# Patient Record
Sex: FEMALE | Race: WHITE | NOT HISPANIC OR LATINO
[De-identification: names, ages, dates, MRNs, and addresses within clinical notes are randomized per-mention and may not be internally consistent; named-entity substitution may affect disease eponyms.]

---

## 2017-01-27 ENCOUNTER — RECORD ABSTRACTING (OUTPATIENT)
Age: 27
End: 2017-01-27

## 2017-01-27 DIAGNOSIS — Z80.8 FAMILY HISTORY OF MALIGNANT NEOPLASM OF OTHER ORGANS OR SYSTEMS: ICD-10-CM

## 2017-01-27 DIAGNOSIS — Z86.19 PERSONAL HISTORY OF OTHER INFECTIOUS AND PARASITIC DISEASES: ICD-10-CM

## 2017-01-27 DIAGNOSIS — D23.5 OTHER BENIGN NEOPLASM OF SKIN OF TRUNK: ICD-10-CM

## 2017-01-27 DIAGNOSIS — Z78.9 OTHER SPECIFIED HEALTH STATUS: ICD-10-CM

## 2017-01-27 PROBLEM — Z00.00 ENCOUNTER FOR PREVENTIVE HEALTH EXAMINATION: Status: ACTIVE | Noted: 2017-01-27

## 2017-02-22 ENCOUNTER — APPOINTMENT (OUTPATIENT)
Dept: DERMATOLOGY | Facility: CLINIC | Age: 27
End: 2017-02-22

## 2017-06-08 ENCOUNTER — APPOINTMENT (OUTPATIENT)
Dept: MRI IMAGING | Facility: CLINIC | Age: 27
End: 2017-06-08

## 2017-06-08 ENCOUNTER — OUTPATIENT (OUTPATIENT)
Dept: OUTPATIENT SERVICES | Facility: HOSPITAL | Age: 27
LOS: 1 days | End: 2017-06-08
Payer: COMMERCIAL

## 2017-06-08 DIAGNOSIS — Z00.8 ENCOUNTER FOR OTHER GENERAL EXAMINATION: ICD-10-CM

## 2017-06-08 PROCEDURE — 72141 MRI NECK SPINE W/O DYE: CPT | Mod: 26

## 2017-06-08 PROCEDURE — 72146 MRI CHEST SPINE W/O DYE: CPT

## 2017-06-08 PROCEDURE — 72146 MRI CHEST SPINE W/O DYE: CPT | Mod: 26

## 2017-06-08 PROCEDURE — 72141 MRI NECK SPINE W/O DYE: CPT

## 2017-07-25 ENCOUNTER — APPOINTMENT (OUTPATIENT)
Dept: DERMATOLOGY | Facility: CLINIC | Age: 27
End: 2017-07-25

## 2017-07-25 VITALS — HEIGHT: 62 IN | WEIGHT: 110 LBS | BODY MASS INDEX: 20.24 KG/M2

## 2017-07-25 DIAGNOSIS — B07.9 VIRAL WART, UNSPECIFIED: ICD-10-CM

## 2017-07-25 DIAGNOSIS — F41.8 OTHER SPECIFIED ANXIETY DISORDERS: ICD-10-CM

## 2017-07-25 RX ORDER — SPIRONOLACTONE 25 MG/1
25 TABLET ORAL
Refills: 0 | Status: DISCONTINUED | COMMUNITY
End: 2017-07-25

## 2017-07-27 ENCOUNTER — OTHER (OUTPATIENT)
Age: 27
End: 2017-07-27

## 2018-05-15 ENCOUNTER — APPOINTMENT (OUTPATIENT)
Dept: DERMATOLOGY | Facility: CLINIC | Age: 28
End: 2018-05-15
Payer: COMMERCIAL

## 2018-05-15 PROCEDURE — 99214 OFFICE O/P EST MOD 30 MIN: CPT

## 2018-05-15 RX ORDER — CLINDAMYCIN PHOSPHATE AND BENZOYL PEROXIDE 10; 25 MG/G; MG/G
1.2-2.5 GEL TOPICAL
Qty: 1 | Refills: 2 | Status: DISCONTINUED | COMMUNITY
End: 2018-05-15

## 2018-06-20 ENCOUNTER — APPOINTMENT (OUTPATIENT)
Dept: DERMATOLOGY | Facility: CLINIC | Age: 28
End: 2018-06-20
Payer: COMMERCIAL

## 2018-06-20 PROCEDURE — 99214 OFFICE O/P EST MOD 30 MIN: CPT

## 2018-06-20 RX ORDER — SPIRONOLACTONE 50 MG/1
50 TABLET ORAL
Qty: 60 | Refills: 3 | Status: DISCONTINUED | COMMUNITY
Start: 2018-05-15 | End: 2018-06-20

## 2018-07-13 ENCOUNTER — APPOINTMENT (OUTPATIENT)
Dept: DERMATOLOGY | Facility: CLINIC | Age: 28
End: 2018-07-13
Payer: COMMERCIAL

## 2018-07-13 PROCEDURE — 99214 OFFICE O/P EST MOD 30 MIN: CPT

## 2018-07-13 RX ORDER — MINOCYCLINE HYDROCHLORIDE 100 MG/1
100 CAPSULE ORAL
Qty: 60 | Refills: 0 | Status: DISCONTINUED | COMMUNITY
Start: 2018-06-04 | End: 2018-07-13

## 2018-07-13 RX ORDER — SERTRALINE 25 MG/1
TABLET, FILM COATED ORAL
Refills: 0 | Status: DISCONTINUED | COMMUNITY
End: 2018-07-13

## 2018-07-13 RX ORDER — TAZAROTENE 0.05 MG/G
0.05 CREAM CUTANEOUS
Qty: 50 | Refills: 2 | Status: DISCONTINUED | COMMUNITY
Start: 2017-07-25 | End: 2018-07-13

## 2018-07-13 RX ORDER — TRETINOIN 0.8 MG/G
0.08 GEL TOPICAL
Refills: 0 | Status: DISCONTINUED | COMMUNITY
End: 2018-07-13

## 2018-07-20 ENCOUNTER — APPOINTMENT (OUTPATIENT)
Dept: DERMATOLOGY | Facility: CLINIC | Age: 28
End: 2018-07-20

## 2018-07-23 PROBLEM — Z80.8 FAMILY HISTORY OF SKIN CANCER: Status: ACTIVE | Noted: 2017-01-27

## 2018-08-17 ENCOUNTER — APPOINTMENT (OUTPATIENT)
Dept: DERMATOLOGY | Facility: CLINIC | Age: 28
End: 2018-08-17
Payer: COMMERCIAL

## 2018-08-17 PROCEDURE — 99213 OFFICE O/P EST LOW 20 MIN: CPT

## 2018-08-17 RX ORDER — DAPSONE 50 MG/G
5 GEL TOPICAL
Refills: 0 | Status: DISCONTINUED | COMMUNITY
End: 2018-08-17

## 2018-08-17 RX ORDER — CLONAZEPAM 0.5 MG/1
0.5 TABLET ORAL
Qty: 60 | Refills: 0 | Status: DISCONTINUED | COMMUNITY
Start: 2018-01-12 | End: 2018-08-17

## 2018-08-17 RX ORDER — IMIQUIMOD 50 MG/G
5 CREAM TOPICAL
Qty: 1 | Refills: 2 | Status: DISCONTINUED | COMMUNITY
Start: 2017-07-25 | End: 2018-08-17

## 2018-09-14 ENCOUNTER — APPOINTMENT (OUTPATIENT)
Dept: DERMATOLOGY | Facility: CLINIC | Age: 28
End: 2018-09-14

## 2018-09-24 ENCOUNTER — RX RENEWAL (OUTPATIENT)
Age: 28
End: 2018-09-24

## 2018-11-23 ENCOUNTER — EMERGENCY (EMERGENCY)
Facility: HOSPITAL | Age: 28
LOS: 0 days | Discharge: HOME | End: 2018-11-23
Attending: EMERGENCY MEDICINE | Admitting: EMERGENCY MEDICINE
Payer: COMMERCIAL

## 2018-11-23 VITALS
DIASTOLIC BLOOD PRESSURE: 89 MMHG | SYSTOLIC BLOOD PRESSURE: 119 MMHG | RESPIRATION RATE: 19 BRPM | OXYGEN SATURATION: 98 % | HEART RATE: 105 BPM | TEMPERATURE: 98 F

## 2018-11-23 VITALS — WEIGHT: 111.99 LBS | HEIGHT: 63 IN

## 2018-11-23 DIAGNOSIS — L98.8 OTHER SPECIFIED DISORDERS OF THE SKIN AND SUBCUTANEOUS TISSUE: ICD-10-CM

## 2018-11-23 PROCEDURE — 99283 EMERGENCY DEPT VISIT LOW MDM: CPT

## 2018-11-23 NOTE — ED STATDOCS - PROGRESS NOTE DETAILS
SNEHA Mullen:   Patient has been seen, evaluated and orders have been written by the attending in intake. Patient is stable.  I will follow up the results of orders written and I will continue to evaluate/observe the patient.  Dana Mullen PA-C Will dc home.  F?U with Derm on Tues.  Try antifungal cream to area BID.  Keep open to air.  Avoid touching.  Cont. Bactroban as directed.  Benadryl if itching.  Dana Mullen PA-C

## 2018-11-23 NOTE — ED STATDOCS - SKIN, MLM
+2cm area of erosion on the anterior chin, mildly erythematous, excoriation. No drainage, no vesicles, no discharge.

## 2018-11-23 NOTE — ED STATDOCS - OBJECTIVE STATEMENT
29 y/o female with no pertinent PMHx presents to the ED c/o worsening rash to chin x1 month. Pt went to dermatologist 1 week ago where she was determined to have an infection, was prescribed topical abx and cephalexin BID at that time which pt has been taking for 1 week. At time of eval, pt states the rash burns when she touches it and it is very itchy, pt states it wakes her up at night due to discomfort. Pt states she occasionally picks at the rash. Rash is localized to chin, denies rash elsewhere. Pt has appointment with dermatology on 11/27/18. No other acute complaints at time of eval. PCP: Dr. Scott; Derm: Dr. Rosen.

## 2018-11-23 NOTE — ED STATDOCS - MEDICAL DECISION MAKING DETAILS
Pt with skin erosion to chin.  Already being treated with antibiotics with mild improvement.  Will add antifungal.  Will have pt f/u with dermatology on Tuesday as already scheduled.

## 2018-11-27 ENCOUNTER — APPOINTMENT (OUTPATIENT)
Dept: DERMATOLOGY | Facility: CLINIC | Age: 28
End: 2018-11-27
Payer: COMMERCIAL

## 2018-11-27 DIAGNOSIS — L29.9 PRURITUS, UNSPECIFIED: ICD-10-CM

## 2018-11-27 DIAGNOSIS — L98.1 FACTITIAL DERMATITIS: ICD-10-CM

## 2018-11-27 PROCEDURE — 99214 OFFICE O/P EST MOD 30 MIN: CPT | Mod: 25

## 2018-11-27 PROCEDURE — 11900 INJECT SKIN LESIONS </W 7: CPT

## 2018-12-03 LAB — BACTERIA WND CULT: NORMAL

## 2018-12-05 ENCOUNTER — APPOINTMENT (OUTPATIENT)
Dept: DERMATOLOGY | Facility: CLINIC | Age: 28
End: 2018-12-05

## 2019-01-02 ENCOUNTER — RX RENEWAL (OUTPATIENT)
Age: 29
End: 2019-01-02

## 2019-01-05 ENCOUNTER — EMERGENCY (EMERGENCY)
Facility: HOSPITAL | Age: 29
LOS: 0 days | Discharge: HOME | End: 2019-01-06
Attending: EMERGENCY MEDICINE | Admitting: EMERGENCY MEDICINE
Payer: COMMERCIAL

## 2019-01-05 VITALS
WEIGHT: 110.01 LBS | SYSTOLIC BLOOD PRESSURE: 126 MMHG | RESPIRATION RATE: 18 BRPM | HEART RATE: 91 BPM | OXYGEN SATURATION: 99 % | DIASTOLIC BLOOD PRESSURE: 81 MMHG | TEMPERATURE: 99 F | HEIGHT: 62 IN

## 2019-01-05 DIAGNOSIS — G91.9 HYDROCEPHALUS, UNSPECIFIED: ICD-10-CM

## 2019-01-05 DIAGNOSIS — F32.9 MAJOR DEPRESSIVE DISORDER, SINGLE EPISODE, UNSPECIFIED: ICD-10-CM

## 2019-01-05 DIAGNOSIS — F68.8 OTHER SPECIFIED DISORDERS OF ADULT PERSONALITY AND BEHAVIOR: ICD-10-CM

## 2019-01-05 DIAGNOSIS — F41.9 ANXIETY DISORDER, UNSPECIFIED: ICD-10-CM

## 2019-01-05 DIAGNOSIS — F90.9 ATTENTION-DEFICIT HYPERACTIVITY DISORDER, UNSPECIFIED TYPE: ICD-10-CM

## 2019-01-05 DIAGNOSIS — F43.25 ADJUSTMENT DISORDER WITH MIXED DISTURBANCE OF EMOTIONS AND CONDUCT: ICD-10-CM

## 2019-01-05 PROCEDURE — 99285 EMERGENCY DEPT VISIT HI MDM: CPT | Mod: 25

## 2019-01-05 NOTE — ED PROVIDER NOTE - OBJECTIVE STATEMENT
Pt. is a 27 yo F with a hx of ADHD, depression, anxiety, borderline personality disorder living with parents and going to college BIB ambulance for "nervous breakdown" per dad.  Patient states she is here because she "can't think, can't speak, and wants to have sex with herself."  Pt. states she had recent ED visit due to crawling sensation on skin and chin.  Dad states 4 years ago patient had similar "meltdown."  States since college has been on break her routine is broken and her younger sisters have been home and socially her environment changed.  Pt. takes sertraline per dad and he thinks she smokes pot but does not suspect other drug use.  Pt. does not answer questions directly when interviewed.

## 2019-01-05 NOTE — ED PROVIDER NOTE - PROGRESS NOTE DETAILS
Pt. sleeping and unable to give urine at this time and likely unable to cooperate or stay awake for interview with telepsych.  Will re-evaluate in 1-2 hours. DONG:  Spoke to telepsych for consult.  Patient now awake and gave urine. Telepsych attending cleared patient as a treat and release and recommends continuing outpatient therapy and finding a psychiatrist.  Patient was able to stay composed during interview and there was no inpatient hospitalization or new medication changes recommended.

## 2019-01-05 NOTE — ED ADULT TRIAGE NOTE - CHIEF COMPLAINT QUOTE
Patient was brought in by EMS for depression and anxiety, as per patient's father she is "off of her medications." Patient had reported non-violent outburst at home.

## 2019-01-06 DIAGNOSIS — F43.25 ADJUSTMENT DISORDER WITH MIXED DISTURBANCE OF EMOTIONS AND CONDUCT: ICD-10-CM

## 2019-01-06 DIAGNOSIS — R69 ILLNESS, UNSPECIFIED: ICD-10-CM

## 2019-01-06 LAB
ALBUMIN SERPL ELPH-MCNC: 3.9 G/DL — SIGNIFICANT CHANGE UP (ref 3.3–5)
ALP SERPL-CCNC: 37 U/L — LOW (ref 40–120)
ALT FLD-CCNC: 22 U/L — SIGNIFICANT CHANGE UP (ref 12–78)
AMPHET UR-MCNC: NEGATIVE — SIGNIFICANT CHANGE UP
ANION GAP SERPL CALC-SCNC: 9 MMOL/L — SIGNIFICANT CHANGE UP (ref 5–17)
APAP SERPL-MCNC: < 2 UG/ML (ref 10–30)
APPEARANCE UR: CLEAR — SIGNIFICANT CHANGE UP
AST SERPL-CCNC: 13 U/L — LOW (ref 15–37)
BARBITURATES UR SCN-MCNC: NEGATIVE — SIGNIFICANT CHANGE UP
BASOPHILS # BLD AUTO: 0.05 K/UL — SIGNIFICANT CHANGE UP (ref 0–0.2)
BASOPHILS NFR BLD AUTO: 0.6 % — SIGNIFICANT CHANGE UP (ref 0–2)
BENZODIAZ UR-MCNC: NEGATIVE — SIGNIFICANT CHANGE UP
BILIRUB SERPL-MCNC: 0.5 MG/DL — SIGNIFICANT CHANGE UP (ref 0.2–1.2)
BILIRUB UR-MCNC: NEGATIVE — SIGNIFICANT CHANGE UP
BUN SERPL-MCNC: 15 MG/DL — SIGNIFICANT CHANGE UP (ref 7–23)
CALCIUM SERPL-MCNC: 8.9 MG/DL — SIGNIFICANT CHANGE UP (ref 8.5–10.1)
CHLORIDE SERPL-SCNC: 105 MMOL/L — SIGNIFICANT CHANGE UP (ref 96–108)
CO2 SERPL-SCNC: 24 MMOL/L — SIGNIFICANT CHANGE UP (ref 22–31)
COCAINE METAB.OTHER UR-MCNC: NEGATIVE — SIGNIFICANT CHANGE UP
COLOR SPEC: YELLOW — SIGNIFICANT CHANGE UP
CREAT SERPL-MCNC: 0.84 MG/DL — SIGNIFICANT CHANGE UP (ref 0.5–1.3)
DIFF PNL FLD: NEGATIVE — SIGNIFICANT CHANGE UP
EOSINOPHIL # BLD AUTO: 0.15 K/UL — SIGNIFICANT CHANGE UP (ref 0–0.5)
EOSINOPHIL NFR BLD AUTO: 1.7 % — SIGNIFICANT CHANGE UP (ref 0–6)
ETHANOL SERPL-MCNC: <10 MG/DL — SIGNIFICANT CHANGE UP (ref 0–10)
GLUCOSE SERPL-MCNC: 99 MG/DL — SIGNIFICANT CHANGE UP (ref 70–99)
GLUCOSE UR QL: NEGATIVE MG/DL — SIGNIFICANT CHANGE UP
HCG SERPL-ACNC: <1 MIU/ML — SIGNIFICANT CHANGE UP
HCT VFR BLD CALC: 40.6 % — SIGNIFICANT CHANGE UP (ref 34.5–45)
HGB BLD-MCNC: 14 G/DL — SIGNIFICANT CHANGE UP (ref 11.5–15.5)
IMM GRANULOCYTES NFR BLD AUTO: 0.2 % — SIGNIFICANT CHANGE UP (ref 0–1.5)
KETONES UR-MCNC: ABNORMAL
LEUKOCYTE ESTERASE UR-ACNC: NEGATIVE — SIGNIFICANT CHANGE UP
LYMPHOCYTES # BLD AUTO: 1.96 K/UL — SIGNIFICANT CHANGE UP (ref 1–3.3)
LYMPHOCYTES # BLD AUTO: 22.8 % — SIGNIFICANT CHANGE UP (ref 13–44)
MCHC RBC-ENTMCNC: 33.3 PG — SIGNIFICANT CHANGE UP (ref 27–34)
MCHC RBC-ENTMCNC: 34.5 GM/DL — SIGNIFICANT CHANGE UP (ref 32–36)
MCV RBC AUTO: 96.7 FL — SIGNIFICANT CHANGE UP (ref 80–100)
METHADONE UR-MCNC: NEGATIVE — SIGNIFICANT CHANGE UP
MONOCYTES # BLD AUTO: 0.51 K/UL — SIGNIFICANT CHANGE UP (ref 0–0.9)
MONOCYTES NFR BLD AUTO: 5.9 % — SIGNIFICANT CHANGE UP (ref 2–14)
NEUTROPHILS # BLD AUTO: 5.91 K/UL — SIGNIFICANT CHANGE UP (ref 1.8–7.4)
NEUTROPHILS NFR BLD AUTO: 68.8 % — SIGNIFICANT CHANGE UP (ref 43–77)
NITRITE UR-MCNC: NEGATIVE — SIGNIFICANT CHANGE UP
NRBC # BLD: 0 /100 WBCS — SIGNIFICANT CHANGE UP (ref 0–0)
OPIATES UR-MCNC: NEGATIVE — SIGNIFICANT CHANGE UP
PCP SPEC-MCNC: SIGNIFICANT CHANGE UP
PCP UR-MCNC: NEGATIVE — SIGNIFICANT CHANGE UP
PH UR: 6 — SIGNIFICANT CHANGE UP (ref 5–8)
PLATELET # BLD AUTO: 312 K/UL — SIGNIFICANT CHANGE UP (ref 150–400)
POTASSIUM SERPL-MCNC: 3.6 MMOL/L — SIGNIFICANT CHANGE UP (ref 3.5–5.3)
POTASSIUM SERPL-SCNC: 3.6 MMOL/L — SIGNIFICANT CHANGE UP (ref 3.5–5.3)
PROT SERPL-MCNC: 7.3 GM/DL — SIGNIFICANT CHANGE UP (ref 6–8.3)
PROT UR-MCNC: NEGATIVE MG/DL — SIGNIFICANT CHANGE UP
RBC # BLD: 4.2 M/UL — SIGNIFICANT CHANGE UP (ref 3.8–5.2)
RBC # FLD: 11.1 % — SIGNIFICANT CHANGE UP (ref 10.3–14.5)
SALICYLATES SERPL-MCNC: <1.7 MG/DL (ref 2.8–20)
SODIUM SERPL-SCNC: 138 MMOL/L — SIGNIFICANT CHANGE UP (ref 135–145)
SP GR SPEC: 1.01 — SIGNIFICANT CHANGE UP (ref 1.01–1.02)
THC UR QL: POSITIVE — SIGNIFICANT CHANGE UP
UROBILINOGEN FLD QL: NEGATIVE MG/DL — SIGNIFICANT CHANGE UP
WBC # BLD: 8.6 K/UL — SIGNIFICANT CHANGE UP (ref 3.8–10.5)
WBC # FLD AUTO: 8.6 K/UL — SIGNIFICANT CHANGE UP (ref 3.8–10.5)

## 2019-01-06 PROCEDURE — 90792 PSYCH DIAG EVAL W/MED SRVCS: CPT | Mod: GT

## 2019-01-06 PROCEDURE — 93010 ELECTROCARDIOGRAM REPORT: CPT

## 2019-01-06 NOTE — ED BEHAVIORAL HEALTH ASSESSMENT NOTE - SUMMARY
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[employer], [employer]),  woman, non-caregiver, with a reported history of ADHD, depression, anxiety, and borderline personality disorder, no prior inpatient psychiatric hospitalizations, no prior suicide attempts (but a history of non-suicidal self-injury), no reported history of physical violence/aggression, no active legal issues, with no reported active substance use (but suspected cannabis use), no reported history of complicated alcohol withdrawal/DTs, with a PMH significant for a cranial shunt secondary to hydrocephalus, who was brought in by EMS, referred by her family, for anxiety and bizarre behavior.    The patient's current presentation is most likely suggestive of an exacerbation of her underlying cluster B personality traits in the context of chronic and acute psychosocial stressors, including having unstructured time after a difficult semester and the arrival back home of her high-achieving younger sisters. Although the patient is functioning at a level that is below her baseline, she is denying any acute suicidal or homicidal ideation/intention/plan and has been caring for her ADLs appropriately. The patient is not an imminent threat to self or others at this point in time and does not require acute inpatient psychiatric hospitalization. She would benefit from outpatient mental health follow-up, particularly re-engagement in a DBT program.

## 2019-01-06 NOTE — ED BEHAVIORAL HEALTH ASSESSMENT NOTE - RISK ASSESSMENT
The patient is at low acute risk for suicidality or homicidality but at chronic elevated risk for suicidality. Her risk factors include history of non-suicidal self-injury, anxiety, impulsivity, and suspected substance abuse. Her protective factors include no acute suicidal or homicidal ideation/intent/plan, no known direct access to firearms, engaged in school, future orientation, living with family, good social supports, and no reported family history of suicidality.

## 2019-01-06 NOTE — ED BEHAVIORAL HEALTH ASSESSMENT NOTE - DETAILS
The patient denies any acute suicidal ideation/intent/plan but reports that she has thought about suicide in the past and endorses a history of non-suicidal self-injury (denies any recent such behavior). Mother (cardiac disease) The patient's father has been contacted and is aware of the plan.

## 2019-01-06 NOTE — ED BEHAVIORAL HEALTH ASSESSMENT NOTE - REFERRAL / APPOINTMENT DETAILS
The patient has been encouraged to follow-up with the therapist that she has connected with over the course of the past several weeks and re-establish care with an outpatient psychiatrist. She has been advised that reengagement in a DBT program may be particularly helpful.

## 2019-01-06 NOTE — ED ADULT NURSE NOTE - OBJECTIVE STATEMENT
pt arrives to ED brought in by [facility] after parents called EMS for manic behavior. pt with known history of depression, anxiety, ADHD. father states pt "has been off her meds." on arrival pt making erratic statements, flight of thoughts. pt denies SI, HI.

## 2019-01-06 NOTE — ED BEHAVIORAL HEALTH ASSESSMENT NOTE - DESCRIPTION
Per ED staff, the patient was difficult to verbally redirect and received a PRN dose of Ativan.    Vital Signs Last 24 Hrs  T(C): 37 (05 Jan 2019 23:10), Max: 37 (05 Jan 2019 23:10)  T(F): 98.6 (05 Jan 2019 23:10), Max: 98.6 (05 Jan 2019 23:10)  HR: 91 (05 Jan 2019 23:10) (91 - 91)  BP: 126/81 (05 Jan 2019 23:10) (126/81 - 126/81)  BP(mean): --  RR: 18 (05 Jan 2019 23:10) (18 - 18)  SpO2: 99% (05 Jan 2019 23:10) (99% - 99%) Shunt for hydrocephalus The patient is single and currently lives in a private residence with family in [place], [place]. 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

## 2019-01-06 NOTE — ED BEHAVIORAL HEALTH ASSESSMENT NOTE - HPI (INCLUDE ILLNESS QUALITY, SEVERITY, DURATION, TIMING, CONTEXT, MODIFYING FACTORS, ASSOCIATED SIGNS AND SYMPTOMS)
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[employer], [employer]),  woman, non-caregiver, with a reported history of ADHD, depression, anxiety, and borderline personality disorder, no prior inpatient psychiatric hospitalizations, no prior suicide attempts (but a history of non-suicidal self-injury), no reported history of physical violence/aggression, no active legal issues, with no reported active substance use (but suspected cannabis use), no reported history of complicated alcohol withdrawal/DTs, with a PMH significant for a cranial shunt secondary to hydrocephalus, who was brought in by EMS, referred by her family, for anxiety and bizarre behavior.    On interview this morning, the patient states that she is not quite sure why she was brought to the hospital. She states that she suspects that her parents called EMS because they "don't want her to end up with a dangerous annamarie." She speaks that she recently started masturbating again to help with her depression. Over the course of the past several weeks, the patient reports that her mood has generally been "numb." She endorses anergia and chronic anorexia, but denies insomnia, anhedonia, feelings of hopelessness, helplessness, or guilt, or poor concentration. The patient denies any acute suicidal or homicidal ideation/intent/plan and does not have any direct access to firearms. She endorses a history of non-suicidal self-injury but denies any prior suicide attempts or history of physical violence/aggression. The patient denies any acute symptoms of psychosis (i.e. perceptual disturbances or suspiciousness/paranoia). She denies any recent periods of increased energy despite decreased need for sleep or euphoric/irritable mood. The patient describes herself as an anxious person and states that she worries about "everything." She reports that her anxiety is associated with many different physical symptoms. The patient denies any history of physical/sexual/verbal abuse.    Collateral information was obtained from the patient's father, Jad Leon ([phone]). He reports that the patient has chronic emotional issues and is diagnosed with ADHD, depression, anxiety, narcissim, and borderline personality disorder. He reports that she was doing well 4-5 years ago when she was in treatment with a psychiatrist and engaged in a DBT program. He states that this past semester has been difficult for the patient and reports that she has not had any structure over the course of the past several weeks since school has been out. Moreover, her 2 high achieving sisters have been home for the past several weeks from college. The patient has been "worrying about what she will do with her life." Several nights ago, the patient stayed out late and did not tell her parents where she was going. This afternoon, the patient seemed "elusive" and started acting "strange (not eating, talking fast, preoccupied with sex and death). She was verbally abusive towards her father and subsequently got aggressive with him when he informed her that she would need to go to the hospital.

## 2019-01-06 NOTE — ED BEHAVIORAL HEALTH ASSESSMENT NOTE - SAFETY PLAN DETAILS
The patient has been encouraged to call 911 or report to the nearest ED should the patient develop any acute suicidal or homicidal ideation/intent/plan.

## 2019-01-06 NOTE — ED ADULT NURSE NOTE - NSIMPLEMENTINTERV_GEN_ALL_ED
Implemented All Universal Safety Interventions:  Jay to call system. Call bell, personal items and telephone within reach. Instruct patient to call for assistance. Room bathroom lighting operational. Non-slip footwear when patient is off stretcher. Physically safe environment: no spills, clutter or unnecessary equipment. Stretcher in lowest position, wheels locked, appropriate side rails in place.

## 2019-01-06 NOTE — ED BEHAVIORAL HEALTH ASSESSMENT NOTE - DESCRIPTION (FIRST USE, LAST USE, QUANTITY, FREQUENCY, DURATION)
The patient denies any current use of cannabis but use is suspected (including by the patient's father).

## 2019-01-06 NOTE — ED BEHAVIORAL HEALTH ASSESSMENT NOTE - OTHER PAST PSYCHIATRIC HISTORY (INCLUDE DETAILS REGARDING ONSET, COURSE OF ILLNESS, INPATIENT/OUTPATIENT TREATMENT)
The patient has never before been hospitalized in an inpatient psychiatric facility. She was recently in treatment with an outpatient psychiatrist who was prescribing her Zoloft but this provider has moved away. The patient has been speaking to a new therapist for the past several weeks. She was in an outpatient DBT program 4-5 years ago.

## 2019-01-06 NOTE — ED BEHAVIORAL HEALTH ASSESSMENT NOTE - DIFFERENTIAL
Borderline personality disorder, adjustment disorder with mixed disturbance of emotions and conduct, bipolar disorder (unlikely due to time course and surrounding circumstances)

## 2019-01-06 NOTE — ED BEHAVIORAL HEALTH ASSESSMENT NOTE - MEDICATIONS (PRESCRIPTIONS, DIRECTIONS)
The patient has been encouraged to continue taking her outpatient psychotropic medications as recently prescribed.

## 2019-07-15 PROBLEM — F41.9 ANXIETY DISORDER, UNSPECIFIED: Chronic | Status: ACTIVE | Noted: 2019-01-05

## 2019-07-15 PROBLEM — F60.3 BORDERLINE PERSONALITY DISORDER: Chronic | Status: ACTIVE | Noted: 2019-01-05

## 2019-07-15 PROBLEM — F90.8 ATTENTION-DEFICIT HYPERACTIVITY DISORDER, OTHER TYPE: Chronic | Status: ACTIVE | Noted: 2019-01-05

## 2019-07-15 PROBLEM — F32.9 MAJOR DEPRESSIVE DISORDER, SINGLE EPISODE, UNSPECIFIED: Chronic | Status: ACTIVE | Noted: 2019-01-05

## 2019-07-31 ENCOUNTER — APPOINTMENT (OUTPATIENT)
Dept: DERMATOLOGY | Facility: CLINIC | Age: 29
End: 2019-07-31
Payer: COMMERCIAL

## 2019-07-31 VITALS — WEIGHT: 126 LBS | BODY MASS INDEX: 23.19 KG/M2 | HEIGHT: 62 IN

## 2019-07-31 DIAGNOSIS — L72.3 SEBACEOUS CYST: ICD-10-CM

## 2019-07-31 PROCEDURE — 99214 OFFICE O/P EST MOD 30 MIN: CPT

## 2019-07-31 NOTE — HISTORY OF PRESENT ILLNESS
[de-identified] : The patient has been fit in for urgent appointment. \par c/o lesion on neck, was enlarged and painful 2 weeks ago, had friend try to pop it;  now improved\par \par also: acne;  on spriono, tretinoin;  good progress

## 2019-07-31 NOTE — PHYSICAL EXAM
[FreeTextEntry3] : Skin examination performed of the face, neck, chest, hands, lower legs;\par The patient is well, alert and oriented, pleasant and cooperative.\par Eyelids, conjunctivae, oral mucosa, digits and nails all normal.  \par No cervical adenopathy.\par \par \par R posterior neck;  + nontender; fluctuant subcutaneous nodule \par \par mild residual acne;  R > L cheek

## 2019-07-31 NOTE — ASSESSMENT
[FreeTextEntry1] : Ruptured cyst;  now improving spontaneously; \par nature explained;  no further tx unless recurs; \par \par acne;  cont spirono, tretinoin;  add avar wash\par recheck in fall

## 2019-11-18 ENCOUNTER — MEDICATION RENEWAL (OUTPATIENT)
Age: 29
End: 2019-11-18

## 2020-01-07 NOTE — ED STATDOCS - CHPI ED RELIEVING FACTORS
Problem: Suicide  Goal: *ABLE TO CONTROL ANXIETY RESPONSE  Outcome: Resolved/Met     Problem: Post Traumatic Stress (Adult/Pediatric)  Goal: *ABLE TO CONTROL ANXIETY RESPONSE  Outcome: Resolved/Met     Problem: Depressed Mood (Adult/Pediatric)  Goal: *STG: Participates in treatment plan  Outcome: Resolved/Met  Goal: *STG: Verbalizes anger, guilt, and other feelings in a constructive manor  Outcome: Resolved/Met  Goal: *STG: Attends activities and groups  Outcome: Resolved/Met  Goal: *STG: Remains safe in hospital  Outcome: Resolved/Met nothing

## 2020-03-16 ENCOUNTER — RX RENEWAL (OUTPATIENT)
Age: 30
End: 2020-03-16

## 2020-10-16 ENCOUNTER — RX RENEWAL (OUTPATIENT)
Age: 30
End: 2020-10-16

## 2020-10-27 ENCOUNTER — APPOINTMENT (OUTPATIENT)
Dept: DERMATOLOGY | Facility: CLINIC | Age: 30
End: 2020-10-27
Payer: COMMERCIAL

## 2020-10-27 PROCEDURE — 99213 OFFICE O/P EST LOW 20 MIN: CPT

## 2020-10-27 PROCEDURE — 99072 ADDL SUPL MATRL&STAF TM PHE: CPT

## 2020-10-27 RX ORDER — SULFACETAMIDE SODIUM, SULFUR 100; 50 MG/G; MG/G
10-5 EMULSION TOPICAL TWICE DAILY
Qty: 1 | Refills: 5 | Status: DISCONTINUED | COMMUNITY
Start: 2019-07-31 | End: 2020-10-27

## 2020-10-27 RX ORDER — TRETINOIN 0.5 MG/G
0.05 CREAM TOPICAL
Qty: 1 | Refills: 3 | Status: DISCONTINUED | COMMUNITY
Start: 2018-05-15 | End: 2020-10-27

## 2020-10-27 RX ORDER — DOXYCYCLINE HYCLATE 100 MG/1
100 TABLET ORAL
Qty: 28 | Refills: 0 | Status: DISCONTINUED | COMMUNITY
Start: 2018-11-27 | End: 2020-10-27

## 2020-10-27 NOTE — PHYSICAL EXAM
[FreeTextEntry3] : Skin examination performed of the face, neck, chest, hands, lower legs;\par The patient is well, alert and oriented, pleasant and cooperative.\par Eyelids, conjunctivae, oral mucosa, digits and nails all normal.  \par No cervical adenopathy.\par \par Face with minimal residual acne\par pitted scars; glabella

## 2020-10-27 NOTE — ASSESSMENT
[FreeTextEntry1] : acne;  cont spirono,at 100 BID\par Therapeutic options and their risks and benefits; along with multiple diagnostic possibilities were discussed at length;\par \par overall well controlled;  discussed pixel briefly for pitting\par Continue regular exams;

## 2020-10-27 NOTE — HISTORY OF PRESENT ILLNESS
[de-identified] : The patient has been fit in for urgent appointment. \par c/o lesion on neck, was enlarged and painful 2 weeks ago, had friend try to pop it;  now improved\par \par also: acne;  on spriono, 100 BID; no topicals

## 2020-11-20 ENCOUNTER — RX RENEWAL (OUTPATIENT)
Age: 30
End: 2020-11-20

## 2021-02-10 ENCOUNTER — APPOINTMENT (OUTPATIENT)
Dept: SURGERY | Facility: CLINIC | Age: 31
End: 2021-02-10
Payer: COMMERCIAL

## 2021-02-10 VITALS
BODY MASS INDEX: 19.84 KG/M2 | WEIGHT: 112 LBS | DIASTOLIC BLOOD PRESSURE: 68 MMHG | OXYGEN SATURATION: 98 % | TEMPERATURE: 97.6 F | HEIGHT: 63 IN | SYSTOLIC BLOOD PRESSURE: 98 MMHG | RESPIRATION RATE: 18 BRPM | HEART RATE: 108 BPM

## 2021-02-10 PROCEDURE — 99072 ADDL SUPL MATRL&STAF TM PHE: CPT

## 2021-02-10 PROCEDURE — 46221 LIGATION OF HEMORRHOID(S): CPT

## 2021-02-10 PROCEDURE — 99244 OFF/OP CNSLTJ NEW/EST MOD 40: CPT | Mod: 25

## 2021-02-10 RX ORDER — LITHIUM CARBONATE 300 MG/1
300 TABLET, FILM COATED, EXTENDED RELEASE ORAL
Qty: 60 | Refills: 0 | Status: DISCONTINUED | COMMUNITY
Start: 2020-09-22 | End: 2021-02-10

## 2021-02-10 RX ORDER — DESOGESTREL/ETHINYL ESTRADIOL AND ETHINYL ESTRADIOL 21-5 (28)
0.15-0.02/0.01 KIT ORAL
Qty: 28 | Refills: 0 | Status: DISCONTINUED | COMMUNITY
Start: 2018-06-10 | End: 2021-02-10

## 2021-02-10 RX ORDER — SERTRALINE HYDROCHLORIDE 100 MG/1
100 TABLET, FILM COATED ORAL
Refills: 0 | Status: DISCONTINUED | COMMUNITY
End: 2021-02-10

## 2021-02-12 ENCOUNTER — NON-APPOINTMENT (OUTPATIENT)
Age: 31
End: 2021-02-12

## 2021-02-15 NOTE — CONSULT LETTER
[Dear  ___] : Dear ~CLAY, [Consult Letter:] : I had the pleasure of evaluating your patient, [unfilled]. [Please see my note below.] : Please see my note below. [Consult Closing:] : Thank you very much for allowing me to participate in the care of this patient.  If you have any questions, please do not hesitate to contact me. [Sincerely,] : Sincerely, [FreeTextEntry2] : Dr Zandra Tirado [FreeTextEntry3] : Hodan Hwang M.D., F.A.C.S., F.MEGHAN.S.C.R.S.\par Assistant Professor of Surgery\par [employer] at [employer] [facility] [employer]\par \par

## 2021-02-15 NOTE — ASSESSMENT
[FreeTextEntry1] : 31 yo female with hemorrhoidal disease, treated with 1st session of RBL today.\par Instructions given.\par RTO in 3 weeks for next session of RBL.\par

## 2021-02-15 NOTE — PROCEDURE
[FreeTextEntry1] : I recommended rubber band ligation, explained details, risks, benefits, alternatives. Patient wished to proceed. \par Under anoscopy, the left lateral internal hemorrhoid was rubber banded above the dentate line. The patient tolerated the procedure well.\par \par

## 2021-02-15 NOTE — HISTORY OF PRESENT ILLNESS
[FreeTextEntry1] : Corinne is a 30 year old female here for a consultation for rectal bleeding. \par Today she reports BRBPR for the past two weeks. BRBPR daily with bowel movements in the toilet bowl. Reports prolapsing tissue, able to manually reduce it.  Denies pain or constipation. No history of abdominal surgery. Denies the use of anticoagulants. No family history of Colon CA. \par Colonoscopy from 02/02/2021 with internal hemorrhoids, enlarged and with ulcerations, bx c/w inflamed hemorrhoids. One mm polyp in the ascending colon. Pathology: Tubular adenoma.

## 2021-02-17 ENCOUNTER — NON-APPOINTMENT (OUTPATIENT)
Age: 31
End: 2021-02-17

## 2021-04-27 NOTE — PHYSICAL EXAM
[FreeTextEntry1] : This is a 30 year-old well-developed female in no apparent distress.\par \par HEENT normocephalic, anicteric, external ears normal bilaterally, EOMs intact.\par \par Cardiac - regular rate and rhythm.\par \par Abdomen soft, nontender, nondistended, no masses. No hepatosplenomegaly.\par \par No inguinal lymphadenopathy bilaterally.\par \par Examination of the perineum reveals no external hemorrhoids or fissures. \par Digital rectal examination reveals normal sphincter tone. \par Anoscopy reveals three-quadrant moderately enlarged internal hemorrhoids. The largest ones are the left lateral and right anterolateral bundles. The left lateral bundle has stigmata of recent bleeding.  \par \par Neuro-cranial nerves grossly intact. Normal gait.\par \par Psychiatric-oriented to time place and person. Good understanding of conversation.\par \par \par 
no

## 2021-07-07 NOTE — ED PROVIDER NOTE - PMH
5-year-old boy who presents with his mother because of a concern of undescended testicles.  They were never told he did not have descended testicles.  His father was given a bath and felt as if he did not have any testicles in his scrotum.  He has not had a well-child check after 6 months of age.  At that well-child check he had descended testicles bilaterally.    He is behind on all immunizations and his mother states that they are not interested in giving him immunizations.    He recently had some ENT surgery and has known speech delay which mother states he is going to speech therapy for.  She states he is improving.  His speech is noticeably delayed for his age.  Importance of continue with speech therapy was reinforced with mother.    Physical exam:Pulse 93, temperature 97 °F (36.1 °C), temperature source Tympanic, height 3' 4\" (1.016 m), weight 18.2 kg, SpO2 99 %.  Growth chart reviewed and normal.  Appropriately interactive.  His lungs are clear  CV:  RRR without murmur, no peripheral edema  Neurological exam is grossly nonfocal  Abdomen is soft, nontender, no hepatosplenomegaly.  No inguinal adenopathy.  Normal circumcised penis, normal scrotum and he does have normal descended testicles bilaterally.    Assessment and plan:  Reassurance given that he does have normal bilateral descended testicles.  Speech delay discussed, encouraged to continue with speech therapy.  Discharged in stable condition.     Anxiety    Attention deficit hyperactivity disorder (ADHD), other type    Borderline personality disorder in adult    Depression, unspecified depression type

## 2021-07-14 ENCOUNTER — APPOINTMENT (OUTPATIENT)
Dept: SURGERY | Facility: CLINIC | Age: 31
End: 2021-07-14
Payer: COMMERCIAL

## 2021-07-14 VITALS
TEMPERATURE: 96.7 F | DIASTOLIC BLOOD PRESSURE: 57 MMHG | WEIGHT: 112 LBS | HEART RATE: 99 BPM | SYSTOLIC BLOOD PRESSURE: 105 MMHG | OXYGEN SATURATION: 99 % | RESPIRATION RATE: 18 BRPM | BODY MASS INDEX: 19.84 KG/M2 | HEIGHT: 63 IN

## 2021-07-14 PROCEDURE — 99072 ADDL SUPL MATRL&STAF TM PHE: CPT

## 2021-07-14 PROCEDURE — 46221 LIGATION OF HEMORRHOID(S): CPT

## 2021-07-14 NOTE — ASSESSMENT
[FreeTextEntry1] : 32 yo female with hemorrhoidal disease, treated with a session of RBL today.\par Instructions given, all questions answered.\par RTO after 3 weeks for next session of RBL.\par

## 2021-07-14 NOTE — PHYSICAL EXAM
[FreeTextEntry1] : This is a 31 year-old well-developed female in no apparent distress.\par \par Examination of the perineum reveals no external hemorrhoids or fissures. \par Digital rectal examination reveals normal sphincter tone. \par Anoscopy reveals very enlarged right anterolateral and right posterolateral internal hemorrhoids, largest one is the right posterolateral bundle. The previously banded left lateral area has healed well.    \par \par Psychiatric-oriented to time place and person. Good understanding of conversation.  Nervous.\par \par \par

## 2021-07-14 NOTE — HISTORY OF PRESENT ILLNESS
[FreeTextEntry1] : Corinne is a 31 yr old female here for a follow up visit for hemorrhoidal disease. Lasts seen on 02/10/21, the left lateral internal hemorrhoid was rubber banded. \par \par Today she reports she has had significant improvement in her rectal bleeding and the extent of prolapsing tissue after the one rubber banding session.  She continues to experience some prolapsing tissue daily with bowel movements and reduces itself.  She sees BRBPR occasionally.  States she had an uneventful recovery but she was trying to avoid coming back because it is an uncomfortable experience.  However she feels that she still has symptoms that she would like treated. \par \par Reports having soft daily bowel movements. Occasional hard stool.  Denies pain or constipation. No history of abdominal surgery. Denies the use of anticoagulants. No family history of Colon CA. \par \par Colonoscopy from 02/02/2021 with internal hemorrhoids, enlarged and with ulcerations, bx c/w inflamed hemorrhoids. One mm polyp in the ascending colon. Pathology: Tubular adenoma. \par  \par

## 2021-08-18 ENCOUNTER — APPOINTMENT (OUTPATIENT)
Dept: SURGERY | Facility: CLINIC | Age: 31
End: 2021-08-18
Payer: COMMERCIAL

## 2021-08-18 VITALS
SYSTOLIC BLOOD PRESSURE: 99 MMHG | RESPIRATION RATE: 18 BRPM | HEIGHT: 63 IN | WEIGHT: 112 LBS | HEART RATE: 99 BPM | OXYGEN SATURATION: 99 % | DIASTOLIC BLOOD PRESSURE: 78 MMHG | BODY MASS INDEX: 19.84 KG/M2

## 2021-08-18 DIAGNOSIS — K64.1 SECOND DEGREE HEMORRHOIDS: ICD-10-CM

## 2021-08-18 PROCEDURE — 46221 LIGATION OF HEMORRHOID(S): CPT

## 2021-08-19 NOTE — ASSESSMENT
[FreeTextEntry1] : 32 yo female with hemorrhoidal disease, treated with a session of RBL today.\par Instructions given, all questions answered.\par RTO as needed.\par

## 2021-08-19 NOTE — HISTORY OF PRESENT ILLNESS
[FreeTextEntry1] : Corinne is a 31 year old female here for RBL.\par So far she has had 2 sessions of rubber banding in February and July 2021.  \par Today she reports she has had significant improvement after the last session of RBL She continues to experience some prolapsing tissue daily with bowel movements which reduces itself.  Reports having normal daily bowel habits. Reports occasional hard stool.  Denies the use of anticoagulants.

## 2021-08-19 NOTE — PHYSICAL EXAM
[FreeTextEntry1] : This is a 31 year-old well-developed female in no apparent distress.\par \par Examination of the perineum reveals no external hemorrhoids or fissures. \par Digital rectal examination reveals normal sphincter tone. \par Anoscopy reveals an enlarged right anterolateral internal hemorrhoid. The previously banded left lateral and right posterolateral areas have healed well.    \par \par \par

## 2021-08-19 NOTE — PROCEDURE
[FreeTextEntry1] : I recommended rubber band ligation, explained details, risks, benefits, alternatives. Patient wished to proceed. \par Under anoscopy, the right anterolateral internal hemorrhoid was rubber banded above the dentate line. The patient tolerated the procedure well.\par

## 2021-10-28 ENCOUNTER — APPOINTMENT (OUTPATIENT)
Dept: DERMATOLOGY | Facility: CLINIC | Age: 31
End: 2021-10-28
Payer: COMMERCIAL

## 2021-10-28 DIAGNOSIS — Z12.83 ENCOUNTER FOR SCREENING FOR MALIGNANT NEOPLASM OF SKIN: ICD-10-CM

## 2021-10-28 DIAGNOSIS — D22.9 MELANOCYTIC NEVI, UNSPECIFIED: ICD-10-CM

## 2021-10-28 DIAGNOSIS — L70.9 ACNE, UNSPECIFIED: ICD-10-CM

## 2021-10-28 DIAGNOSIS — L73.0 ACNE KELOID: ICD-10-CM

## 2021-10-28 PROCEDURE — 99213 OFFICE O/P EST LOW 20 MIN: CPT

## 2021-10-28 RX ORDER — TRETINOIN 1 MG/G
0.1 CREAM TOPICAL
Qty: 1 | Refills: 6 | Status: ACTIVE | COMMUNITY
Start: 2021-10-28 | End: 1900-01-01

## 2022-03-01 NOTE — ED PROVIDER NOTE - CONSTITUTIONAL, MLM
normal... Well appearing, well nourished, awake, alert, oriented to person, place, time/situation and in no apparent distress. 2+ edema noted right leg

## 2023-06-06 RX ORDER — SPIRONOLACTONE 100 MG/1
100 TABLET ORAL TWICE DAILY
Qty: 180 | Refills: 0 | Status: ACTIVE | COMMUNITY
Start: 2018-06-20 | End: 1900-01-01

## 2023-08-22 ENCOUNTER — APPOINTMENT (OUTPATIENT)
Dept: DERMATOLOGY | Facility: CLINIC | Age: 33
End: 2023-08-22

## 2023-11-06 NOTE — ED ADULT NURSE NOTE - NS ED NURSE RECORD ANOTHER VITAL SIGN
November 6, 2023        Elvia Santos MD  [address] [facility] [address]             [facility]  - Physical Medicine and Rehabilitation  [address] [address] [address]  Phone: [phone]   Patient: Maranda Dupree   MR Number: [id number]   YOB: 2012   Date of Visit: 11/6/2023       Dear Dr. Santos:    Thank you for referring Maranda Dupree to me for evaluation. Below are the relevant portions of my assessment and plan of care.            If you have questions, please do not hesitate to call me. I look forward to following Maranda along with you.    Sincerely,      Smith Hamilton MD           CC  No Recipients        Yes

## 2025-07-06 ENCOUNTER — EMERGENCY (EMERGENCY)
Facility: HOSPITAL | Age: 35
LOS: 0 days | Discharge: HOME | End: 2025-07-07
Attending: EMERGENCY MEDICINE
Payer: COMMERCIAL

## 2025-07-06 VITALS
RESPIRATION RATE: 25 BRPM | HEART RATE: 95 BPM | HEIGHT: 64 IN | SYSTOLIC BLOOD PRESSURE: 109 MMHG | OXYGEN SATURATION: 96 % | DIASTOLIC BLOOD PRESSURE: 72 MMHG | TEMPERATURE: 97 F | WEIGHT: 111.99 LBS

## 2025-07-06 DIAGNOSIS — R51.9 HEADACHE, UNSPECIFIED: ICD-10-CM

## 2025-07-06 DIAGNOSIS — Z98.2 PRESENCE OF CEREBROSPINAL FLUID DRAINAGE DEVICE: Chronic | ICD-10-CM

## 2025-07-06 LAB
ALBUMIN SERPL ELPH-MCNC: 4.1 G/DL — SIGNIFICANT CHANGE UP (ref 3.3–5)
ALP SERPL-CCNC: 35 U/L — LOW (ref 40–120)
ALT FLD-CCNC: 20 U/L — SIGNIFICANT CHANGE UP (ref 12–78)
ANION GAP SERPL CALC-SCNC: 8 MMOL/L — SIGNIFICANT CHANGE UP (ref 5–17)
AST SERPL-CCNC: 18 U/L — SIGNIFICANT CHANGE UP (ref 15–37)
BASOPHILS # BLD AUTO: 0.04 K/UL — SIGNIFICANT CHANGE UP (ref 0–0.2)
BASOPHILS NFR BLD AUTO: 0.3 % — SIGNIFICANT CHANGE UP (ref 0–2)
BILIRUB SERPL-MCNC: 0.6 MG/DL — SIGNIFICANT CHANGE UP (ref 0.2–1.2)
BUN SERPL-MCNC: 11 MG/DL — SIGNIFICANT CHANGE UP (ref 7–23)
CALCIUM SERPL-MCNC: 9.1 MG/DL — SIGNIFICANT CHANGE UP (ref 8.5–10.1)
CHLORIDE SERPL-SCNC: 104 MMOL/L — SIGNIFICANT CHANGE UP (ref 96–108)
CO2 SERPL-SCNC: 25 MMOL/L — SIGNIFICANT CHANGE UP (ref 22–31)
CREAT SERPL-MCNC: 0.87 MG/DL — SIGNIFICANT CHANGE UP (ref 0.5–1.3)
EGFR: 90 ML/MIN/1.73M2 — SIGNIFICANT CHANGE UP
EGFR: 90 ML/MIN/1.73M2 — SIGNIFICANT CHANGE UP
EOSINOPHIL # BLD AUTO: 0.02 K/UL — SIGNIFICANT CHANGE UP (ref 0–0.5)
EOSINOPHIL NFR BLD AUTO: 0.2 % — SIGNIFICANT CHANGE UP (ref 0–6)
ETHANOL SERPL-MCNC: <10 MG/DL — SIGNIFICANT CHANGE UP (ref 0–10)
GLUCOSE SERPL-MCNC: 110 MG/DL — HIGH (ref 70–99)
HCT VFR BLD CALC: 36.3 % — SIGNIFICANT CHANGE UP (ref 34.5–45)
HGB BLD-MCNC: 12.9 G/DL — SIGNIFICANT CHANGE UP (ref 11.5–15.5)
IMM GRANULOCYTES # BLD AUTO: 0.06 K/UL — SIGNIFICANT CHANGE UP (ref 0–0.07)
IMM GRANULOCYTES NFR BLD AUTO: 0.5 % — SIGNIFICANT CHANGE UP (ref 0–0.9)
LYMPHOCYTES # BLD AUTO: 1.01 K/UL — SIGNIFICANT CHANGE UP (ref 1–3.3)
LYMPHOCYTES NFR BLD AUTO: 8.1 % — LOW (ref 13–44)
MCHC RBC-ENTMCNC: 34 PG — SIGNIFICANT CHANGE UP (ref 27–34)
MCHC RBC-ENTMCNC: 35.5 G/DL — SIGNIFICANT CHANGE UP (ref 32–36)
MCV RBC AUTO: 95.8 FL — SIGNIFICANT CHANGE UP (ref 80–100)
MONOCYTES # BLD AUTO: 0.63 K/UL — SIGNIFICANT CHANGE UP (ref 0–0.9)
MONOCYTES NFR BLD AUTO: 5.1 % — SIGNIFICANT CHANGE UP (ref 2–14)
NEUTROPHILS # BLD AUTO: 10.64 K/UL — HIGH (ref 1.8–7.4)
NEUTROPHILS NFR BLD AUTO: 85.8 % — HIGH (ref 43–77)
NRBC # BLD AUTO: 0 K/UL — SIGNIFICANT CHANGE UP (ref 0–0)
NRBC # FLD: 0 K/UL — SIGNIFICANT CHANGE UP (ref 0–0)
NRBC BLD AUTO-RTO: 0 /100 WBCS — SIGNIFICANT CHANGE UP (ref 0–0)
PLATELET # BLD AUTO: 372 K/UL — SIGNIFICANT CHANGE UP (ref 150–400)
PMV BLD: 8.6 FL — SIGNIFICANT CHANGE UP (ref 7–13)
POTASSIUM SERPL-MCNC: 4 MMOL/L — SIGNIFICANT CHANGE UP (ref 3.5–5.3)
POTASSIUM SERPL-SCNC: 4 MMOL/L — SIGNIFICANT CHANGE UP (ref 3.5–5.3)
PROT SERPL-MCNC: 7.2 GM/DL — SIGNIFICANT CHANGE UP (ref 6–8.3)
RBC # BLD: 3.79 M/UL — LOW (ref 3.8–5.2)
RBC # FLD: 11.8 % — SIGNIFICANT CHANGE UP (ref 10.3–14.5)
SODIUM SERPL-SCNC: 137 MMOL/L — SIGNIFICANT CHANGE UP (ref 135–145)
WBC # BLD: 12.4 K/UL — HIGH (ref 3.8–10.5)
WBC # FLD AUTO: 12.4 K/UL — HIGH (ref 3.8–10.5)

## 2025-07-06 PROCEDURE — 96375 TX/PRO/DX INJ NEW DRUG ADDON: CPT

## 2025-07-06 PROCEDURE — 84702 CHORIONIC GONADOTROPIN TEST: CPT

## 2025-07-06 PROCEDURE — 93010 ELECTROCARDIOGRAM REPORT: CPT

## 2025-07-06 PROCEDURE — 93005 ELECTROCARDIOGRAM TRACING: CPT

## 2025-07-06 PROCEDURE — 70450 CT HEAD/BRAIN W/O DYE: CPT

## 2025-07-06 PROCEDURE — 85025 COMPLETE CBC W/AUTO DIFF WBC: CPT

## 2025-07-06 PROCEDURE — 96374 THER/PROPH/DIAG INJ IV PUSH: CPT

## 2025-07-06 PROCEDURE — 74018 RADEX ABDOMEN 1 VIEW: CPT | Mod: 26

## 2025-07-06 PROCEDURE — 99285 EMERGENCY DEPT VISIT HI MDM: CPT | Mod: 25

## 2025-07-06 PROCEDURE — 71045 X-RAY EXAM CHEST 1 VIEW: CPT | Mod: 26

## 2025-07-06 PROCEDURE — 70250 X-RAY EXAM OF SKULL: CPT | Mod: 26

## 2025-07-06 PROCEDURE — 80307 DRUG TEST PRSMV CHEM ANLYZR: CPT

## 2025-07-06 PROCEDURE — 71045 X-RAY EXAM CHEST 1 VIEW: CPT

## 2025-07-06 PROCEDURE — 80053 COMPREHEN METABOLIC PANEL: CPT

## 2025-07-06 PROCEDURE — 99285 EMERGENCY DEPT VISIT HI MDM: CPT

## 2025-07-06 PROCEDURE — 36415 COLL VENOUS BLD VENIPUNCTURE: CPT

## 2025-07-06 PROCEDURE — 70250 X-RAY EXAM OF SKULL: CPT

## 2025-07-06 PROCEDURE — 74018 RADEX ABDOMEN 1 VIEW: CPT

## 2025-07-06 PROCEDURE — 99053 MED SERV 10PM-8AM 24 HR FAC: CPT

## 2025-07-06 PROCEDURE — 70450 CT HEAD/BRAIN W/O DYE: CPT | Mod: 26

## 2025-07-06 RX ORDER — ACETAMINOPHEN 500 MG/5ML
1000 LIQUID (ML) ORAL ONCE
Refills: 0 | Status: COMPLETED | OUTPATIENT
Start: 2025-07-06 | End: 2025-07-06

## 2025-07-06 RX ORDER — DIPHENHYDRAMINE HCL 12.5MG/5ML
25 ELIXIR ORAL ONCE
Refills: 0 | Status: COMPLETED | OUTPATIENT
Start: 2025-07-06 | End: 2025-07-06

## 2025-07-06 RX ORDER — METOCLOPRAMIDE HCL 10 MG
10 TABLET ORAL ONCE
Refills: 0 | Status: COMPLETED | OUTPATIENT
Start: 2025-07-06 | End: 2025-07-06

## 2025-07-06 RX ADMIN — Medication 400 MILLIGRAM(S): at 23:01

## 2025-07-06 RX ADMIN — Medication 10 MILLIGRAM(S): at 23:05

## 2025-07-06 RX ADMIN — Medication 25 MILLIGRAM(S): at 23:05

## 2025-07-06 NOTE — ED PROVIDER NOTE - OBJECTIVE STATEMENT
- Summary : 35-year-old female presents to the Emergency Department with complaints of sudden onset headache and dizziness, accompanied by vomiting and signs of dehydration. Patient has a history of congenital hydrocephalus with  shunt placement in 1999.  - Chief Complaint (CC) : Sudden onset headache and dizziness  - History of Present Illness : The patient is a 35-year-old female who presents to the Emergency Department with a sudden onset of sharp headache and dizziness that began today. The headache is described as sharp and located primarily in the right frontal region, not radiating down the neck. The patient reports associated symptoms of vomiting, with approximately five episodes throughout the day. She has been unable to tolerate oral intake, consuming only about 8 ounces of water all day, leading to concerns of dehydration. The patient's father brought her to the ED due to these symptoms. The patient appears to be experiencing some confusion and delirium, repeatedly asking the same questions and expressing concern about her breath. She denies any visual disturbances or slurred speech. On a pain scale of 1-10, the patient rates her current headache pain as a 5 out of 10. The patient has not taken any medication for pain relief at home.  - Past Medical History : The patient has a significant history of congenital hydrocephalus, for which she underwent ventriculoperitoneal () shunt placement in 1999 at [facility] in [place]. She has been followed by a neurosurgeon, Dr. Garcia, with routine shunt checks every couple of years, though it has not been checked recently. The patient also has a history of mental health issues, including a diagnosis of borderline personality disorder in 2019. She was previously on lithium but insisted on discontinuing it several years ago. Since then, she has reportedly been functioning well without psychiatric medications.  - Past Surgical History :  shunt placement in 1999 for congenital hydrocephalus  - Family History :  - No significant family history mentioned in the conversation    - Social History :  - History of heavy marijuana use in the past    - Currently denies smoking, drug use, or alcohol consumption    - Close relationship with her dog

## 2025-07-06 NOTE — ED PROVIDER NOTE - PATIENT PORTAL LINK FT
You can access the FollowMyHealth Patient Portal offered by [facility] by registering at the following website: [email or web address]. By joining [facility]’s FollowMyHealth portal, you will also be able to view your health information using other applications (apps) compatible with our system.

## 2025-07-06 NOTE — ED PROVIDER NOTE - CLINICAL SUMMARY MEDICAL DECISION MAKING FREE TEXT BOX
- Physical Examination (PE) : Patient is awake, alert, and oriented to person and place, but not to year. Cranial nerves 2-12 are grossly intact. No pronator drift observed. 5/5 strength in all extremities. Finger-to-nose test intact. Lungs clear to auscultation bilaterally. Heart is tachycardic but regular. Skin is warm, dry, and intact with no rash observed. Neck is supple. Patient exhibits some confusion and delirium, repeating questions and expressing concern about her breath.  Assessment and Plan:  - Acute Headache with Possible Shunt Malfunction : The patient presents with a sudden onset of sharp headache, associated with dizziness and vomiting. Given her history of  shunt for congenital hydrocephalus, there is a concern for possible shunt malfunction or increased intracranial pressure.  - Obtain CT of the head to evaluate for hydrocephalus or other intracranial abnormalities    - Perform shunt series to assess shunt patency and position    - Administer IV fluids for dehydration    - Give Reglan for headache and nausea    - If CT is negative for intracranial hemorrhage, administer Toradol for pain relief    - Consult neurosurgery if CT shows evidence of hydrocephalus or shunt malfunction    - Reassess after initial interventions and imaging results    - Dehydration : Patient reports minimal fluid intake and multiple episodes of vomiting, indicating likely dehydration.  - Administer IV fluids    - Monitor urine output    - Reassess hydration status after fluid administration

## 2025-07-06 NOTE — ED PROVIDER NOTE - PROGRESS NOTE DETAILS
Discussed case with neurosurgeon on call for Dr. Garcia's service.  He doubts that this is an acute malfunction of the shunt.  The patient's neurological exam has been normal and she has resolution of headache and vomiting.  Advised parents that she needs to follow up with Dr. Radha DAVIES.  Tyrone Shoemaker, DO

## 2025-07-06 NOTE — ED PROVIDER NOTE - NSFOLLOWUPINSTRUCTIONS_ED_ALL_ED_FT
Follow up with Dr. Garcia today.     Acute Headache    WHAT YOU NEED TO KNOW:    An acute headache is pain or discomfort that starts suddenly and gets worse quickly. You may have an acute headache only when you feel stress or eat certain foods. Other acute headache pain can happen every day, and sometimes several times a day.     DISCHARGE INSTRUCTIONS:    Return to the emergency department if:     You have severe pain.      You have numbness or weakness on one side of your face or body.      You have a headache that occurs after a blow to the head, a fall, or other trauma.       You have a headache, are forgetful or confused, or have trouble speaking.      You have a headache, stiff neck, and a fever.    Contact your healthcare provider if:     You have a constant headache and are vomiting.      You have a headache each day that does not get better, even after treatment.      You have changes in your headaches, or new symptoms that occur when you have a headache.      You have questions or concerns about your condition or care.    Medicines: You may need any of the following:     Prescription pain medicine may be given. The medicine your healthcare provider recommends will depend on the kind of headaches you have. You will need to take prescription headache medicines as directed to prevent a problem called rebound headache. These headaches happen with regular use of pain relievers for headache disorders.      NSAIDs, such as ibuprofen, help decrease swelling, pain, and fever. This medicine is available with or without a doctor's order. NSAIDs can cause stomach bleeding or kidney problems in certain people. If you take blood thinner medicine, always ask your healthcare provider if NSAIDs are safe for you. Always read the medicine label and follow directions.      Acetaminophen decreases pain and fever. It is available without a doctor's order. Ask how much to take and how often to take it. Follow directions. Read the labels of all other medicines you are using to see if they also contain acetaminophen, or ask your doctor or pharmacist. Acetaminophen can cause liver damage if not taken correctly. Do not use more than 3 grams (3,000 milligrams) total of acetaminophen in one day.       Antidepressants may be given for some kinds of headaches.       Take your medicine as directed. Contact your healthcare provider if you think your medicine is not helping or if you have side effects. Tell him or her if you are allergic to any medicine. Keep a list of the medicines, vitamins, and herbs you take. Include the amounts, and when and why you take them. Bring the list or the pill bottles to follow-up visits. Carry your medicine list with you in case of an emergency.    Manage your symptoms:     Apply heat or ice on the headache area. Use a heat or ice pack. For an ice pack, you can also put crushed ice in a plastic bag. Cover the pack or bag with a towel before you apply it to your skin. Ice and heat both help decrease pain, and heat also helps decrease muscle spasms. Apply heat for 20 to 30 minutes every 2 hours. Apply ice for 15 to 20 minutes every hour. Apply heat or ice for as long and for as many days as directed. You may alternate heat and ice.      Relax your muscles. Lie down in a comfortable position and close your eyes. Relax your muscles slowly. Start at your toes and work your way up your body.      Keep a record of your headaches. Write down when your headaches start and stop. Include your symptoms and what you were doing when the headache began. Record what you ate or drank for 24 hours before the headache started. Describe the pain and where it hurts. Keep track of what you did to treat your headache and if it worked.     Prevent an acute headache:     Avoid anything that triggers an acute headache. Examples include exposure to chemicals, going to high altitude, or not getting enough sleep. Create a regular sleep routine. Go to sleep at the same time and wake up at the same time each day. Do not use electronic devices before bedtime. These may trigger a headache or prevent you from sleeping well.      Do not smoke. Nicotine and other chemicals in cigarettes and cigars can trigger an acute headache or make it worse. Ask your healthcare provider for information if you currently smoke and need help to quit. E-cigarettes or smokeless tobacco still contain nicotine. Talk to your healthcare provider before you use these products.       Limit alcohol as directed. Alcohol can trigger an acute headache or make it worse. If you have cluster headaches, do not drink alcohol during an episode. For other types of headaches, ask your healthcare provider if it is safe for you to drink alcohol. Ask how much is safe for you to drink, and how often.      Exercise as directed. Exercise can reduce tension and help with headache pain. Aim for 30 minutes of physical activity on most days of the week. Your healthcare provider can help you create an exercise plan.      Eat a variety of healthy foods. Healthy foods include fruits, vegetables, low-fat dairy products, lean meats, fish, whole grains, and cooked beans. Your healthcare provider or dietitian can help you create meals plans if you need to avoid foods that trigger headaches.    Follow up with your healthcare provider as directed: Bring your headache record with you when you see your healthcare provider. Write down your questions so you remember to ask them during your visits.

## 2025-07-06 NOTE — ED PROVIDER NOTE - NSICDXPASTMEDICALHX_GEN_ALL_CORE_FT
PAST MEDICAL HISTORY:  Anxiety     Attention deficit hyperactivity disorder (ADHD), other type     Borderline personality disorder in adult     Congenital hydrocephalus     Depression, unspecified depression type

## 2025-07-06 NOTE — ED ADULT TRIAGE NOTE - CHIEF COMPLAINT QUOTE
pt with with headache X2 days HX of  shunt for hydrocephalus no fever at home, vomiting all day stayed in bed unsteady on feet, positive dizziness pt anxious and agitated as per parents.

## 2025-07-07 ENCOUNTER — INPATIENT (INPATIENT)
Facility: HOSPITAL | Age: 35
LOS: 1 days | Discharge: HOME | End: 2025-07-09
Attending: STUDENT IN AN ORGANIZED HEALTH CARE EDUCATION/TRAINING PROGRAM | Admitting: STUDENT IN AN ORGANIZED HEALTH CARE EDUCATION/TRAINING PROGRAM
Payer: COMMERCIAL

## 2025-07-07 VITALS
HEART RATE: 78 BPM | HEIGHT: 64 IN | TEMPERATURE: 98 F | WEIGHT: 111.99 LBS | DIASTOLIC BLOOD PRESSURE: 64 MMHG | SYSTOLIC BLOOD PRESSURE: 107 MMHG | RESPIRATION RATE: 16 BRPM | OXYGEN SATURATION: 96 %

## 2025-07-07 VITALS
HEART RATE: 103 BPM | SYSTOLIC BLOOD PRESSURE: 83 MMHG | RESPIRATION RATE: 20 BRPM | OXYGEN SATURATION: 98 % | DIASTOLIC BLOOD PRESSURE: 64 MMHG | TEMPERATURE: 98 F

## 2025-07-07 DIAGNOSIS — Z98.2 PRESENCE OF CEREBROSPINAL FLUID DRAINAGE DEVICE: Chronic | ICD-10-CM

## 2025-07-07 DIAGNOSIS — T85.618A BREAKDOWN (MECHANICAL) OF OTHER SPECIFIED INTERNAL PROSTHETIC DEVICES, IMPLANTS AND GRAFTS, INITIAL ENCOUNTER: ICD-10-CM

## 2025-07-07 DIAGNOSIS — G91.9 HYDROCEPHALUS, UNSPECIFIED: ICD-10-CM

## 2025-07-07 LAB
ANION GAP SERPL CALC-SCNC: 13 MMOL/L — SIGNIFICANT CHANGE UP (ref 7–14)
APAP SERPL-MCNC: < 2 UG/ML (ref 10–30)
APTT BLD: 28 SEC — SIGNIFICANT CHANGE UP (ref 26.1–36.8)
BLD GP AB SCN SERPL QL: NEGATIVE — SIGNIFICANT CHANGE UP
BUN SERPL-MCNC: 9 MG/DL — SIGNIFICANT CHANGE UP (ref 7–23)
CALCIUM SERPL-MCNC: 8.8 MG/DL — SIGNIFICANT CHANGE UP (ref 8.4–10.5)
CHLORIDE SERPL-SCNC: 101 MMOL/L — SIGNIFICANT CHANGE UP (ref 98–107)
CO2 SERPL-SCNC: 21 MMOL/L — LOW (ref 22–31)
CREAT SERPL-MCNC: 0.58 MG/DL — SIGNIFICANT CHANGE UP (ref 0.5–1.3)
EGFR: 122 ML/MIN/1.73M2 — SIGNIFICANT CHANGE UP
EGFR: 122 ML/MIN/1.73M2 — SIGNIFICANT CHANGE UP
GLUCOSE SERPL-MCNC: 92 MG/DL — SIGNIFICANT CHANGE UP (ref 70–99)
HCG SERPL-ACNC: <1 MIU/ML — SIGNIFICANT CHANGE UP
HCG SERPL-ACNC: <1 MIU/ML — SIGNIFICANT CHANGE UP
HCT VFR BLD CALC: 35.8 % — SIGNIFICANT CHANGE UP (ref 34.5–45)
HGB BLD-MCNC: 12.1 G/DL — SIGNIFICANT CHANGE UP (ref 11.5–15.5)
INR BLD: 1.03 RATIO — SIGNIFICANT CHANGE UP (ref 0.85–1.16)
MCHC RBC-ENTMCNC: 32.5 PG — SIGNIFICANT CHANGE UP (ref 27–34)
MCHC RBC-ENTMCNC: 33.8 G/DL — SIGNIFICANT CHANGE UP (ref 32–36)
MCV RBC AUTO: 96.2 FL — SIGNIFICANT CHANGE UP (ref 80–100)
NRBC # BLD AUTO: 0 K/UL — SIGNIFICANT CHANGE UP (ref 0–0)
NRBC # FLD: 0 K/UL — SIGNIFICANT CHANGE UP (ref 0–0)
NRBC BLD AUTO-RTO: 0 /100 WBCS — SIGNIFICANT CHANGE UP (ref 0–0)
PLATELET # BLD AUTO: 358 K/UL — SIGNIFICANT CHANGE UP (ref 150–400)
PMV BLD: 8.5 FL — SIGNIFICANT CHANGE UP (ref 7–13)
POTASSIUM SERPL-MCNC: 3.8 MMOL/L — SIGNIFICANT CHANGE UP (ref 3.5–5.3)
POTASSIUM SERPL-SCNC: 3.8 MMOL/L — SIGNIFICANT CHANGE UP (ref 3.5–5.3)
PROTHROM AB SERPL-ACNC: 12.2 SEC — SIGNIFICANT CHANGE UP (ref 9.9–13.4)
RBC # BLD: 3.72 M/UL — LOW (ref 3.8–5.2)
RBC # FLD: 11.9 % — SIGNIFICANT CHANGE UP (ref 10.3–14.5)
RH IG SCN BLD-IMP: NEGATIVE — SIGNIFICANT CHANGE UP
SALICYLATES SERPL-MCNC: <1.7 MG/DL — LOW (ref 2.8–20)
SODIUM SERPL-SCNC: 135 MMOL/L — SIGNIFICANT CHANGE UP (ref 135–145)
WBC # BLD: 7.88 K/UL — SIGNIFICANT CHANGE UP (ref 3.8–10.5)
WBC # FLD AUTO: 7.88 K/UL — SIGNIFICANT CHANGE UP (ref 3.8–10.5)

## 2025-07-07 PROCEDURE — 99253 IP/OBS CNSLTJ NEW/EST LOW 45: CPT

## 2025-07-07 PROCEDURE — 99291 CRITICAL CARE FIRST HOUR: CPT

## 2025-07-07 RX ORDER — LORAZEPAM 4 MG/ML
0.5 VIAL (ML) INJECTION ONCE
Refills: 0 | Status: DISCONTINUED | OUTPATIENT
Start: 2025-07-07 | End: 2025-07-07

## 2025-07-07 RX ORDER — SODIUM CHLORIDE 9 G/1000ML
1000 INJECTION, SOLUTION INTRAVENOUS
Refills: 0 | Status: DISCONTINUED | OUTPATIENT
Start: 2025-07-07 | End: 2025-07-08

## 2025-07-07 RX ORDER — ONDANSETRON HCL/PF 4 MG/2 ML
4 VIAL (ML) INJECTION EVERY 6 HOURS
Refills: 0 | Status: DISCONTINUED | OUTPATIENT
Start: 2025-07-07 | End: 2025-07-07

## 2025-07-07 RX ORDER — DEXMEDETOMIDINE HYDROCHLORIDE IN SODIUM CHLORIDE 4 UG/ML
0.2 INJECTION INTRAVENOUS
Qty: 400 | Refills: 0 | Status: DISCONTINUED | OUTPATIENT
Start: 2025-07-07 | End: 2025-07-07

## 2025-07-07 RX ORDER — ONDANSETRON HCL/PF 4 MG/2 ML
4 VIAL (ML) INJECTION EVERY 8 HOURS
Refills: 0 | Status: DISCONTINUED | OUTPATIENT
Start: 2025-07-07 | End: 2025-07-09

## 2025-07-07 RX ORDER — PHENYLEPHRINE HCL IN 0.9% NACL 0.5 MG/5ML
0.1 SYRINGE (ML) INTRAVENOUS
Qty: 160 | Refills: 0 | Status: DISCONTINUED | OUTPATIENT
Start: 2025-07-07 | End: 2025-07-09

## 2025-07-07 RX ORDER — ACETAMINOPHEN 500 MG/5ML
975 LIQUID (ML) ORAL EVERY 8 HOURS
Refills: 0 | Status: DISCONTINUED | OUTPATIENT
Start: 2025-07-07 | End: 2025-07-09

## 2025-07-07 RX ADMIN — Medication 1000 MILLILITER(S): at 00:05

## 2025-07-07 RX ADMIN — Medication 0.5 MILLIGRAM(S): at 16:56

## 2025-07-07 RX ADMIN — SODIUM CHLORIDE 125 MILLILITER(S): 9 INJECTION, SOLUTION INTRAVENOUS at 21:59

## 2025-07-07 RX ADMIN — Medication 0.5 MILLIGRAM(S): at 16:33

## 2025-07-07 RX ADMIN — Medication 0.5 MILLIGRAM(S): at 22:24

## 2025-07-07 RX ADMIN — Medication 75 MILLILITER(S): at 13:01

## 2025-07-07 RX ADMIN — Medication 0.98 MICROGRAM(S)/KG/MIN: at 22:23

## 2025-07-07 RX ADMIN — DEXMEDETOMIDINE HYDROCHLORIDE IN SODIUM CHLORIDE 2.54 MICROGRAM(S)/KG/HR: 4 INJECTION INTRAVENOUS at 21:58

## 2025-07-07 NOTE — H&P ADULT - ASSESSMENT
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 CTH imaging at [facility] revealed increased third and lateral ventricles.  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 Patient was having nausea, vomiting, and headaches that started yesterday, she went to [facility] ED where CTH last night showed hydrocephalus with no comparison imaging and patient was feeling better and neurologically intact. Pt was seen at outpatient neurosurgery office today with Dr. Garcia and was lethargic on exam. She had a shunt tap performed in office with 40cc CSF removed and she was sent to [facility] ER. Shunt series shows that the shunt catheter is broken in the neck with the distal tubing coiled in the abdomen.     7/7 Admitted for MRI and ETV vs Shunt revision

## 2025-07-07 NOTE — CONSULT NOTE ADULT - ATTENDING COMMENTS
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 CTH done at [facility] overnight revealed enlargement of the third and lateral ventricles consistent with hydrocephalus and shunt malfunction. Pt. was seen at outpatient neurosurgery office 7/7with Dr. Garcia and was lethargic on exam. She had a shunt tap performed in office with 40cc removed. Patient is now alert and oriented x 3 with some deficits in memory (i.e cannot remember being seen in outpatient neurosurgery office today) but is otherwise intact. Now admitted to SICU for q1 neuro checks, with plan for OR with NSGY.    NEUROLOGIC   - Pain control: Tylenol  q8H  - Q1 neuro checks  - f/u brain MRI-->needs sedation for MRI tolerance (attempted with ativan but ineffective, will retry with precedex infusion)  - NSGY planning for ETV vs. shunt revision 7/7 vs 7/8    RESPIRATORY   - Monitor SpO2 goal >92%  - Incentive spirometry     CARDIOVASCULAR   - Monitor hemodynamics     GASTROINTESTINAL   - Diet: NPO for OR  - antiemetics PRN For nausea    /RENAL   - IV fluids: LR @ 90/hr  - Strict I/Os  - Monitor BMP qd  - Monitor electrolytes, replete PRN    HEMATOLOGIC  - Monitor H/H   - DVT ppx: SCDs    INFECTIOUS DISEASE  - Monitor fever / WBC    ENDOCRINE  - Monitor gluc    LINES  - PIV    DISPO: SICU

## 2025-07-07 NOTE — ED ADULT TRIAGE NOTE - ESI TRIAGE ACUITY LEVEL, MLM
Nephrology Progress Note:    Subjective:     Following pt for ESRD  HD could not be done yesterday, seen during HD today, tolerating 2 L UF  No acute overnight events   Denies dyspnea, orthopnea    Medications:     Current Facility-Administered Medications   Medication Dose Route Frequency Provider Last Rate Last Admin   • sodium citrate anticoagulant 4 % flush 3 mL  3 mL Intracatheter PRN Maliha Miranda MD       • sodium chloride (NORMAL SALINE) 0.9 % bolus 100-200 mL  100-200 mL Intravenous PRN Maliha Miranda MD       • NIFEdipine XL (PROCARDIA XL) ER tablet 90 mg  90 mg Oral Daily Rolando Tamayo MD       • pantoprazole (PROTONIX) EC tablet 40 mg  40 mg Oral Nightly Rolando Tamayo MD   40 mg at 01/03/22 2034   • apixaBAN (ELIQUIS) tablet 10 mg  10 mg Oral 2 times per day Samara aDo MD   10 mg at 01/02/22 2139   • [START ON 1/6/2022] apixaBAN (ELIQUIS) tablet 5 mg  5 mg Oral 2 times per day Samara Dao MD       • sodium chloride 0.9 % flush bag 25 mL  25 mL Intravenous PRN Fredo Fulton MD       • sodium chloride (PF) 0.9 % injection 2 mL  2 mL Intracatheter 2 times per day Fredo Fulton MD   2 mL at 01/03/22 2041   • sodium chloride (PF) 0.9 % injection 10 mL  10 mL Intracatheter PRN Fredo Fulton MD       • atorvastatin (LIPITOR) tablet 20 mg  20 mg Oral Nightly Alejandra Redding DO   20 mg at 01/03/22 2034   • aspirin (ECOTRIN) enteric coated tablet 81 mg  81 mg Oral Daily Alejandra Redding DO   81 mg at 01/02/22 0912   • glucagon (GLUCAGEN) injection 1 mg  1 mg Intramuscular PRN Samara Dao MD   1 mg at 12/29/21 0611   • lactulose (CHRONULAC) 10 GM/15ML solution 10 g  10 g Oral BID Samara Dao MD   10 g at 01/03/22 2034   • docusate sodium (ENEMEEZ MINI) 283 MG rectal enema 283 mg  283 mg Rectal Once Samara Dao MD       • diphenhydrAMINE (BENADRYL) injection 12.5 mg  12.5 mg Intravenous Q12H PRN Rolando Tamayo MD   12.5 mg at 12/26/21 7964   • VANCOMYCIN - PHARMACIST  MONITORED Misc   Does not apply See Admin Instructions Verenice Broussard MD       • vancomycin (VANCOCIN) 500 mg in sodium chloride 0.9 % 100 mL IVPB  500 mg Intravenous Once per day on Mon Wed Fri Verenice Broussard  mL/hr at 12/31/21 1750 500 mg at 12/31/21 1750   • metoPROLOL tartrate (LOPRESSOR) tablet 50 mg  50 mg Oral 2 times per day Samara Dao MD   50 mg at 01/03/22 2038   • acetaminophen (OFIRMEV) IV solution 1,000 mg  1,000 mg Intravenous Q6H PRN Samara Dao  mL/hr at 12/24/21 2356 1,000 mg at 12/24/21 2356   • acetaminophen (TYLENOL) suppository 650 mg  650 mg Rectal Q6H PRN Dinora Zuluaga PA-C       • bisacodyl (DULCOLAX) suppository 10 mg  10 mg Rectal Daily PRN Samara Dao MD       • sodium chloride (NORMAL SALINE) 0.9 % bolus 100-200 mL  100-200 mL Intravenous PRN Alejandra Medina MD       • lidocaine viscous 2 % oral solution 15 mL  15 mL Oral Matthew Fine MD       • acetaminophen (TYLENOL) tablet 500 mg  500 mg Oral Q6H PRN Rolando Tamayo MD   500 mg at 12/24/21 0030   • aluminum-magnesium hydroxide-simethicone (MAALOX) 200-200-20 MG/5ML suspension 30 mL  30 mL Oral Q4H PRN Rolando Tamayo MD       • diphenhydrAMINE (BENADRYL) capsule 25 mg  25 mg Oral Nightly PRN Rolando Tamayo MD   25 mg at 12/30/21 2119   • docusate sodium (COLACE) 50 MG/5ML liquid 100 mg  100 mg Oral BID PRN Rolando Tamayo MD       • guaiFENesin-DM) (ROBITUSSIN DM) 100-10 MG/5ML syrup 5 mL  5 mL Oral Q6H PRN Rolando Tamayo MD   5 mL at 12/30/21 2124   • hydrALAZINE (APRESOLINE) injection 10 mg  10 mg Intravenous Q8H PRN Rolando Tamayo MD   10 mg at 12/24/21 1917   • LORazepam (ATIVAN) injection 1 mg  1 mg Intravenous Q8H PRN Rolando Tamayo MD       • morphine injection 2 mg  2 mg Intravenous Q4H PRN Rolando Tamayo MD   2 mg at 01/04/22 0211   • ondansetron (ZOFRAN) injection 4 mg  4 mg Intravenous Q12H PRN Rolando Tamayo MD   4 mg at 01/03/22 1128   • aspirin tablet 325 mg  325 mg Per NG  tube Once Antwon Fine MD             Objective:      Visit Vitals  /83 (BP Location: RUE - Right upper extremity, Patient Position: Semi-Kuhn's)   Pulse 72   Temp 98.2 °F (36.8 °C) (Temporal)   Resp 16   Ht 5' 2\" (1.575 m)   Wt 55.8 kg (123 lb)   LMP  (LMP Unknown)   SpO2 96%   BMI 22.50 kg/m²       Physical Exam:  Gen: NAD, Lying in bed on RA  HEENT: NC, AT, no scleral icterus  Neck: Supple, No JVD  CV: RRR S1S2 nl  Lungs: Diminished BS at bases, Non-labored respirations  Abd: Soft, + BS  Ext: No LE edema   Neuro: awake and alert   Psych: calm, pleasant  Skin: No rashes/lesions    Data Review:   Chemistry:  Lab Results   Component Value Date    SODIUM 138 12/30/2021    POTASSIUM 3.9 12/30/2021    CHLORIDE 99 12/30/2021    CO2 22 12/30/2021    BUN 33 (H) 12/30/2021    CREATININE 3.09 (H) 12/30/2021    CALCIUM 10.2 12/30/2021    GLUCOSE 81 12/30/2021      CBC:  Lab Results   Component Value Date    WBC 4.1 (L) 12/30/2021    RBC 3.79 (L) 12/30/2021    HGB 10.3 (L) 12/30/2021    HCT 34.1 (L) 12/30/2021     12/30/2021      Urinalysis Component Data:  No results found for: MUCUS  Cardiac markers:   No results found for: TROPONINI, MYOGLOBIN, CKMB   No components found for: 32008  Magnesium   Date Value Ref Range Status   12/30/2021 1.8 1.7 - 2.4 mg/dL Final     Phosphorus   Date Value Ref Range Status   12/30/2021 5.2 (H) 2.4 - 4.7 mg/dL Final   ?      Imaging Study  Results for orders placed or performed during the hospital encounter of 12/22/21 (from the past 72 hour(s))   US PELVIS NON-OB TRANSABDOMINAL    Impression    FINDINGS/IMPRESSION:   Limited exam, patient is reportedly \"not able to hold her bladder\".  As a  result, nondiagnostic evaluation of the uterus.  Exceptionally limited  evaluation of the bilateral adnexal regions.  Recommend repeat exam when  clinically feasible.        Electronically Signed by: AZALIA STOCK D.O.   Signed on: 1/3/2022 4:15 PM          Intake and Output  I/O last 3  completed shifts:  In: -   Out: 2 [Emesis:2]        Assessment/Plan:     ESRD  - on MWF HD schedule at [facility], followed by Dr. Miller  - HD done 12/27  - left IJ permcath removed 12/27 given MRSA bacteremia  - temp line placed by IR 12/30 right CFV  - HD could not be done yesterday, seen during HD today, tolerating 2 L UF  - plan HD tomorrow again to resume MWF schedule    - permcath placement once repeat cultures negative and ok per ID    MRSA bacteremia with  sepsis  - blood culture 12/24 with MRSA  - ID following  - JIM without vegetations  - HD catheter removed per ID recs, catheter tip culture positive for MRSA  - repeat blood cultures drawn 12/31, negative so far    Small Bowel Obstruction  - likely from fecal impaction  - improving, diet advanced    Hypercalcemia  Secondary hyperparathyroidism of renal origin  - to use 2Ca bath on HD   - sensipar had been on hold with SBO; can resume as diet advanced    Hypertension   - with ESRD  - Continue prior to admission medications  - monitor with UF on HD     Hypoglycemia  - pt hypoglycemic 12/30, had been NPO earlier  - diet resumed    Chronic diastolic CHF   - UF as tolerated with HD     Anemia in CKD  - hemoglobin acceptable at this time, goal > 10       Hyponatremia  - improved per labs 12/30/21     Thank you for the consult.  Please feel free to contact with any further questions.  Will continue to monitor closely from a renal standpoint.    Maliha Miranda MD   [facility]  Please page via [employer]  Answering Service - [phone]    1/4/2022 11:36 AM        2

## 2025-07-07 NOTE — H&P ADULT - NSHPPHYSICALEXAM_GEN_ALL_CORE
AOx3, mildly agitated, follows commands  PERRL, EOMI, face symmetrical   KEENAN x 4 with good strength   Sensation intact to light touch   No pronator drift

## 2025-07-07 NOTE — ED ADULT NURSE NOTE - NSFALLRISKINTERV_ED_ALL_ED

## 2025-07-07 NOTE — ED PROVIDER NOTE - CRITICAL CARE ATTENDING CONTRIBUTION TO CARE
Additional time as above spent by myself, separate from billable procedures, included coordination of patient care with consultants/admitting team, performing reassessments on the patient, documentation, and counseling patient/family members on the care provided.

## 2025-07-07 NOTE — H&P ADULT - ATTENDING COMMENTS
I saw and examined Hank both in the outpatient office and in the hospital. She initially presented extremely lethargic, not following commands, only opening eyes to deep stimulation. Mentation improved. She has large lateral and third ventricles with a small fourth ventrical and when looking at the initial notes from 1999, she has a history of aquaductal stenosis. As such, I think she would be an excellent candidate for an ETV. Ideally we will get an MRI for preoperative planning. We will continue to tap her shunt PRN to stabilize her while we get imaging.

## 2025-07-07 NOTE — ED ADULT NURSE NOTE - OBJECTIVE STATEMENT
35 year old presenting to ER with 2 day hx of headache and vomiting x1 day. Pt sl. agitated and asking parents to answer questions as she does not feel up to it. Pt with  shunt since 1999. Parents state pt has been vomiting all day and very agitated at home, not drinking any fluids and parents think she is dehydrated. Denies fever at home. 35 year old presenting to ER with 2 day hx of headache and vomiting x1 day. Pt sl. agitated and asking parents to answer questions as she does not feel up to it. Pt with  shunt since 1999. Parents state pt has been vomiting all day and very agitated at home, not drinking any fluids and parents think she is dehydrated. Denies fever at home, no visual changes

## 2025-07-07 NOTE — H&P ADULT - PROBLEM SELECTOR PLAN 1
- q1h neuro checks  - SICU admit  - keep NPO w/ IV fluids for maintenance  - MRI brain w/wo CINE/CISS   - preop labs (cbc, bmp, coags, type and screen x 2)  - preop for ETV vs. shunt revision    case d/w attending  50009 - q1h neuro checks  - SICU admit  - keep NPO w/ IV fluids for maintenance  - MRI brain w/wo flow/CISS   - preop labs (cbc, bmp, coags, type and screen x 2)  - preop for ETV vs. shunt revision    Case discussed w Dr. Sewell  [id number]

## 2025-07-07 NOTE — CONSULT NOTE ADULT - ASSESSMENT
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 CTH done at [facility] overnight revealed enlargement of the third and lateral ventricles consistent with hydrocephalus and shunt malfunction. Pt. was seen at outpatient neurosurgery office 7/7with Dr. Garcia and was lethargic on exam. She had a shunt tap performed in office with 40cc removed. Patient is now alert and oriented x 3 with some deficits in memory (i.e cannot remember being seen in outpatient neurosurgery office today) but is otherwise intact. Now admitted to SICU for q1 neuro checks.    NEUROLOGIC   - Pain control: Tylenol  q8H    RESPIRATORY   - Monitor SpO2 goal >92%  - Incentive spirometry     CARDIOVASCULAR   - Monitor hemodynamics   -     GASTROINTESTINAL   - Diet: NPO  -     /RENAL   - IV fluids: LR @ 75cc/hr  - Strict I/Os  - Monitor BMP qd  - Maintain espinosa catheter, strict Is/Os  - Monitor electrolytes, replete PRN    HEMATOLOGIC  - Monitor H/H   - DVT ppx: Lovenox/SQH & SCD's    INFECTIOUS DISEASE  - Monitor fever / WBC    ENDOCRINE  - Monitor gluc    LINES  - IJ CVC (  /  )  - A line (  /  )  - Espinosa (  /  )  - PIV     DISPO:   --------------------------------------------------------------------------------------    Critical Care Diagnoses: ASSESSMENT: 35yoF with 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 CTH done at [facility] overnight revealed enlargement of the third and lateral ventricles consistent with hydrocephalus and shunt malfunction. Pt. was seen at outpatient neurosurgery office 7/7with Dr. Garcia and was lethargic on exam. She had a shunt tap performed in office with 40cc removed. Patient is now alert and oriented x 3 with some deficits in memory (i.e cannot remember being seen in outpatient neurosurgery office today) but is otherwise intact. Now admitted to SICU for q1 neuro checks pending MRI and ETV vs Shunt revision.    NEUROLOGIC   - Pain control: Tylenol  q8H  - Q1 neuro checks  - f/u brain MRI    RESPIRATORY   - Monitor SpO2 goal >92%  - Incentive spirometry     CARDIOVASCULAR   - Monitor hemodynamics     GASTROINTESTINAL   - Diet: NPO  - zofran    /RENAL   - IV fluids: LR @ 90/hr  - Strict I/Os  - Monitor BMP qd  - Maintain espinosa catheter, strict Is/Os  - Monitor electrolytes, replete PRN    HEMATOLOGIC  - Monitor H/H   - DVT ppx: Lovenox/SQH & SCD's    INFECTIOUS DISEASE  - Monitor fever / WBC    ENDOCRINE  - Monitor gluc    LINES  - IJ CVC (  /  )  - A line (  /  )  - Espinosa (  /  )  - PIV     DISPO:   --------------------------------------------------------------------------------------    Critical Care Diagnoses: ASSESSMENT: 35yoF with 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 CTH done at [facility] overnight revealed enlargement of the third and lateral ventricles consistent with hydrocephalus and shunt malfunction. Pt. was seen at outpatient neurosurgery office 7/7with Dr. Garcia and was lethargic on exam. She had a shunt tap performed in office with 40cc removed. Patient is now alert and oriented x 3 with some deficits in memory (i.e cannot remember being seen in outpatient neurosurgery office today) but is otherwise intact. Now admitted to SICU for q1 neuro checks, with plan for OR with NSGY.    NEUROLOGIC   - Pain control: Tylenol  q8H  - Q1 neuro checks  - f/u brain MRI  - NSGY planning for ETV vs. shunt revision 7/7 vs 7/8    RESPIRATORY   - Monitor SpO2 goal >92%  - Incentive spirometry     CARDIOVASCULAR   - Monitor hemodynamics     GASTROINTESTINAL   - Diet: NPO for OR  - antiemetics PRN For nausea    /RENAL   - IV fluids: LR @ 90/hr  - Strict I/Os  - Monitor BMP qd  - Monitor electrolytes, replete PRN    HEMATOLOGIC  - Monitor H/H   - DVT ppx: SCDs    INFECTIOUS DISEASE  - Monitor fever / WBC    ENDOCRINE  - Monitor gluc    LINES  - PIV    DISPO: SICU  --------------------------------------------------------------------------------------

## 2025-07-07 NOTE — H&P ADULT - HISTORY OF PRESENT ILLNESS
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 Patient was having nausea, vomiting, and headaches that started yesterday. CTH done at [facility] overnight revealed enlargement of the third and lateral ventricles consistent with hydrocephalus and shunt malfunction. Pt. was seen at outpatient neurosurgery office today with Dr. Garcia and was lethargic on exam. She had a shunt tap performed in office with 40cc removed. Patient is now alert and oriented x 3 with some deficits in memory (i.e cannot remember being seen in outpatient neurosurgery office today) but is otherwise intact.     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 Patient was having nausea, vomiting, and headaches that started yesterday, she went to [facility] ED where CTH last night showed hydrocephalus with no comparison imaging and patient was feeling better and neurologically intact. Pt was seen at outpatient neurosurgery office today with Dr. Garcia and was lethargic on exam. She had a shunt tap performed in office with 40cc CSF removed and she was sent to [facility] ER. Patient is now alert and oriented x 3 with some deficits in memory (i.e cannot remember being seen in outpatient neurosurgery office today) but is otherwise intact. Shunt series shows that the shunt catheter is broken in the neck with the distal tubing coiled in the abdomen.

## 2025-07-07 NOTE — ED ADULT TRIAGE NOTE - CHIEF COMPLAINT QUOTE
coming from outpatient neurologist for being unresponsive. As per family "pt was unresponsive and went to neurologist and drained 200cc from the brain." Pt currently awake and responsive, but noted to be confused. denies headache, dizziness, blurry vision at this time. was seen at [facility]  yesterday for vomiting and discharged. was told "needs stat CT scan for malfunction of shunt." Hx Hydrocephalus shunt.

## 2025-07-07 NOTE — ED ADULT NURSE NOTE - CHIEF COMPLAINT QUOTE
coming from outpatient neurologist for being unresponsive. As per family "pt was unresponsive and went to neurologist and drained 200cc from the brain." Pt currently awake and responsive, but noted to be confused. denies headache, dizziness, blurry vision at this time. was seen at [facility]  yesterday for vomiting and discharged. was told "needs stat CT scan for malfunction of shunt." Hx Hydrocephalus shunt.

## 2025-07-07 NOTE — ED PROVIDER NOTE - CLINICAL SUMMARY MEDICAL DECISION MAKING FREE TEXT BOX
35-year-old past medical history of  shunt presents emergency department with altered mental status.  Was seen at [facility] yesterday found to have enlarged cerebral ventricle possible  shunt malfunction.  Case was discussed with neurosurgery by the emergency medicine attending at [facility] and was advised likely not  shunt malfunction but patient needed to follow-up with her neurosurgeon as an outpatient.  Mom states that when she was discharged she assisted patient back to her house.  Social history obtained from mother reveals the patient is a masters of social work and is high functioning. Patient herself is not answering any questions she is somnolent but she is aroused.  Patient is ill-appearing, moving all extremities grossly however not compliant with a neurologic exam.  Pupils normal for room.  Case was discussed with neurosurgery plan for emergent shunt revision after stereotactic MRI.  MRI being expedited by neurosurgery.  The patient's condition was not amenable to outpatient treatment due to either the lack of feasibility of outpatient care coordination, possibility for further decompensation with adverse outcome if discharge, or treatments and diagnostic  modalities only available during an inpatient hospitalization.  Additional time as above spent by myself, separate from billable procedures, included coordination of patient care with consultants/admitting team, performing reassessments on the patient, documentation, and counseling patient/family members on the care provided.

## 2025-07-07 NOTE — H&P ADULT - NSHPLABSRESULTS_GEN_ALL_CORE
PROCEDURE DATE:  07/06/2025        INTERPRETATION:  CLINICAL INFORMATION: Headache, dizziness and vomiting.    History of  shunt for congenital hydrocephalus.    COMPARISON: None.    CONTRAST:  IV Contrast: NONE  .    TECHNIQUE:  Serial axial images were obtained from the skull base to the   vertex using multi-slice helical technique. Sagittal and coronal   reformats were obtained.    FINDINGS:    VENTRICLES AND SULCI: A right parietal-occipital approach  shunt   catheter has its tip in the anterior aspect of the right lateral   ventricle.  There is disproportionate enlargement of third ventricle and   both lateral ventricles, with partial effacement of convexity sulci,   consistent with hydrocephalus.  The third ventricle measures   approximately 1 cm in caliber (2:15).  The right temporal horn measures   approximately 1.2 cm in caliber (2:13).  The left temporal horn measures   approximately one in caliber (2:12).  INTRA-AXIAL: No mass effect, acute hemorrhage, or midline shift.  EXTRA-AXIAL: No mass or fluid collection. Basal cisterns are normal in   appearance.    VISUALIZED SINUSES:  Approximately 1.6 cm mucous retention cyst versus   polyp in the right maxillary sinus.  TYMPANOMASTOID CAVITIES:  Clear.  VISUALIZED ORBITS: Normal.  CALVARIUM: Right parietal sergey hole    MISCELLANEOUS: None.      IMPRESSION:  There is disproportionate enlargement of the third ventricle and both   lateral ventricles partial effacement of convexity sulci, consistent with   hydrocephalus.   shunt malfunction should be excluded.          --- End of Report ---            YOMAIRA VILLATORO MD; Attending Radiologist  This document has been electronically signed. Jul 7 2025  1:02AM PROCEDURE DATE:  07/06/2025        INTERPRETATION:  CLINICAL INFORMATION: Headache, dizziness and vomiting.    History of  shunt for congenital hydrocephalus.    COMPARISON: None.    CONTRAST:  IV Contrast: NONE  .    TECHNIQUE:  Serial axial images were obtained from the skull base to the   vertex using multi-slice helical technique. Sagittal and coronal   reformats were obtained.    FINDINGS:    VENTRICLES AND SULCI: A right parietal-occipital approach  shunt   catheter has its tip in the anterior aspect of the right lateral   ventricle.  There is disproportionate enlargement of third ventricle and   both lateral ventricles, with partial effacement of convexity sulci,   consistent with hydrocephalus.  The third ventricle measures   approximately 1 cm in caliber (2:15).  The right temporal horn measures   approximately 1.2 cm in caliber (2:13).  The left temporal horn measures   approximately one in caliber (2:12).  INTRA-AXIAL: No mass effect, acute hemorrhage, or midline shift.  EXTRA-AXIAL: No mass or fluid collection. Basal cisterns are normal in   appearance.    VISUALIZED SINUSES:  Approximately 1.6 cm mucous retention cyst versus   polyp in the right maxillary sinus.  TYMPANOMASTOID CAVITIES:  Clear.  VISUALIZED ORBITS: Normal.  CALVARIUM: Right parietal sergey hole    MISCELLANEOUS: None.      IMPRESSION:  There is disproportionate enlargement of the third ventricle and both   lateral ventricles partial effacement of convexity sulci, consistent with   hydrocephalus.   shunt malfunction should be excluded.    Shunt series: Tubing broken at neck, coiled in abdomen

## 2025-07-07 NOTE — ED ADULT NURSE NOTE - OBJECTIVE STATEMENT
Patient came in sent in from her neurologist office for shunt malfunction. As per father at bedside, patient Patient came in sent in from her neurologist office for shunt malfunction. As per mother at bedside, Patient was seen in [facility] yesterday and neurologist saw her there and wants her to follow up outpatient. As per Mother, she was unresponsive when they took her to neurologist office and they drained 200 cc of fluid from the brain and sent to ED for further evaluation. Patient is awake and alert but forgetful. Patient offers no complaints. Specimens collected and sent. Fluids infusing. Patient is tolerating well. Nursing care continues

## 2025-07-07 NOTE — CONSULT NOTE ADULT - SUBJECTIVE AND OBJECTIVE BOX
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 CTH done at [facility] overnight revealed enlargement of the third and lateral ventricles consistent with hydrocephalus and shunt malfunction. Pt. was seen at outpatient neurosurgery office 7/7with Dr. Garcia and was lethargic on exam. She had a shunt tap performed in office with 40cc removed. Patient is now alert and oriented x 3 with some deficits in memory (i.e cannot remember being seen in outpatient neurosurgery office today) but is otherwise intact.     PMH:  PAST MEDICAL & SURGICAL HISTORY:  Borderline personality disorder in adult  Depression, unspecified depression type  Anxiety  Attention deficit hyperactivity disorder (ADHD), other type  Congenital hydrocephalus  S/P  shunt          ALLERGIES:  No Known Allergies      --------------------------------------------------------------------------------------    MEDICATIONS:    Neurologic Medications  fentaNYL    Injectable 25 MICROGram(s) IV Push every 5 minutes PRN Moderate Pain (4 - 6)  fentaNYL    Injectable 50 MICROGram(s) IV Push every 15 minutes PRN Severe Pain (7 - 10)  ondansetron Injectable 4 milliGRAM(s) IV Push once PRN Nausea and/or Vomiting    Respiratory Medications    Cardiovascular Medications    Gastrointestinal Medications  lactated ringers. 1000 milliLiter(s) IV Continuous <Continuous>    Genitourinary Medications    Hematologic/Oncologic Medications    Antimicrobial/Immunologic Medications    Endocrine/Metabolic Medications  insulin lispro (ADMELOG) corrective regimen sliding scale   SubCutaneous every 6 hours    Topical/Other Medications  chlorhexidine 4% Liquid 1 Application(s) Topical daily    --------------------------------------------------------------------------------------    VITAL SIGNS:  T(C): 36.8 (07-07-25 @ 12:16), Max: 36.9 (07-07-25 @ 03:48)  HR: 76 (07-07-25 @ 14:02) (76 - 103)  BP: 100/60 (07-07-25 @ 14:02) (83/64 - 109/72)  RR: 16 (07-07-25 @ 14:02) (16 - 25)  SpO2: 98% (07-07-25 @ 14:02) (96% - 98%)  --------------------------------------------------------------------------------------    INS AND OUTS:      --------------------------------------------------------------------------------------    EXAM    NEURO: NAD,   HEENT: NC/AT  RESPIRATORY: nonlabored respirations, normal CW expansion  CARDIO: RRR, S1S2  ABDOMEN: soft, nontender, nondistended, +/- NGT, ostomy, surgical dressing c/d/i, BERT drain output  EXTREMITIES: normal strength, no deformities    --------------------------------------------------------------------------------------    LABS    CBC Full  -  ( 07 Jul 2025 13:00 )  WBC Count : 7.88 K/uL  RBC Count : 3.72 M/uL  Hemoglobin : 12.1 g/dL  Hematocrit : 35.8 %  Platelet Count - Automated : 358 K/uL  Mean Cell Volume : 96.2 fl  Mean Cell Hemoglobin : 32.5 pg  Mean Cell Hemoglobin Concentration : 33.8 g/dL  Auto Neutrophil # : x  Auto Lymphocyte # : x  Auto Monocyte # : x  Auto Eosinophil # : x  Auto Basophil # : x  Auto Neutrophil % : x  Auto Lymphocyte % : x  Auto Monocyte % : x  Auto Eosinophil % : x  Auto Basophil % : x    07-07    135  |  101  |  9   ----------------------------<  92  3.8   |  21[L]  |  0.58    Ca    8.8      07 Jul 2025 13:00    TPro  7.2  /  Alb  4.1  /  TBili  0.6  /  DBili  x   /  AST  18  /  ALT  20  /  AlkPhos  35[L]  07-06    -------------------------------------------------------------------------------------- SICU Consult Note    HPI: 35yoF with VPS (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 CTH done at [facility] overnight revealed enlargement of the third and lateral ventricles consistent with hydrocephalus and shunt malfunction. Pt. was seen at outpatient neurosurgery office 7/7with Dr. Garcia and was lethargic on exam. She had a shunt tap performed in office with 40cc removed. Patient is now alert and oriented x 3 with some deficits in memory (i.e cannot remember being seen in outpatient neurosurgery office today) but is otherwise intact. 7/7 Admitted to SICU for neuro checks pending MRI and ETV vs Shunt revision         PMH:  PAST MEDICAL & SURGICAL HISTORY:  Borderline personality disorder in adult  Depression, unspecified depression type  Anxiety  Attention deficit hyperactivity disorder (ADHD), other type  Congenital hydrocephalus  S/P  shunt          ALLERGIES:  No Known Allergies      --------------------------------------------------------------------------------------    MEDICATIONS:    Neurologic Medications  fentaNYL    Injectable 25 MICROGram(s) IV Push every 5 minutes PRN Moderate Pain (4 - 6)  fentaNYL    Injectable 50 MICROGram(s) IV Push every 15 minutes PRN Severe Pain (7 - 10)  ondansetron Injectable 4 milliGRAM(s) IV Push once PRN Nausea and/or Vomiting    Respiratory Medications    Cardiovascular Medications    Gastrointestinal Medications  lactated ringers. 1000 milliLiter(s) IV Continuous <Continuous>    Genitourinary Medications    Hematologic/Oncologic Medications    Antimicrobial/Immunologic Medications    Endocrine/Metabolic Medications  insulin lispro (ADMELOG) corrective regimen sliding scale   SubCutaneous every 6 hours    Topical/Other Medications  chlorhexidine 4% Liquid 1 Application(s) Topical daily    --------------------------------------------------------------------------------------    VITAL SIGNS:  T(C): 36.8 (07-07-25 @ 12:16), Max: 36.9 (07-07-25 @ 03:48)  HR: 76 (07-07-25 @ 14:02) (76 - 103)  BP: 100/60 (07-07-25 @ 14:02) (83/64 - 109/72)  RR: 16 (07-07-25 @ 14:02) (16 - 25)  SpO2: 98% (07-07-25 @ 14:02) (96% - 98%)  --------------------------------------------------------------------------------------    INS AND OUTS:      --------------------------------------------------------------------------------------    EXAM    NEURO: NAD,   HEENT: NC/AT  RESPIRATORY: nonlabored respirations, normal CW expansion  CARDIO: RRR, S1S2  ABDOMEN: soft, nontender, nondistended, +/- NGT, ostomy, surgical dressing c/d/i, BERT drain output  EXTREMITIES: normal strength, no deformities    --------------------------------------------------------------------------------------    LABS    CBC Full  -  ( 07 Jul 2025 13:00 )  WBC Count : 7.88 K/uL  RBC Count : 3.72 M/uL  Hemoglobin : 12.1 g/dL  Hematocrit : 35.8 %  Platelet Count - Automated : 358 K/uL  Mean Cell Volume : 96.2 fl  Mean Cell Hemoglobin : 32.5 pg  Mean Cell Hemoglobin Concentration : 33.8 g/dL  Auto Neutrophil # : x  Auto Lymphocyte # : x  Auto Monocyte # : x  Auto Eosinophil # : x  Auto Basophil # : x  Auto Neutrophil % : x  Auto Lymphocyte % : x  Auto Monocyte % : x  Auto Eosinophil % : x  Auto Basophil % : x    07-07    135  |  101  |  9   ----------------------------<  92  3.8   |  21[L]  |  0.58    Ca    8.8      07 Jul 2025 13:00    TPro  7.2  /  Alb  4.1  /  TBili  0.6  /  DBili  x   /  AST  18  /  ALT  20  /  AlkPhos  35[L]  07-06    -------------------------------------------------------------------------------------- SICU Consult Note    HPI: 35yoF with 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 CTH done at [facility] overnight revealed enlargement of the third and lateral ventricles consistent with hydrocephalus and shunt malfunction. Pt. was seen at outpatient neurosurgery office 7/7with Dr. Garcia and was lethargic on exam. She had a shunt tap performed in office with 40cc removed. Patient is now alert and oriented x 3 with some deficits in memory (i.e cannot remember being seen in outpatient neurosurgery office today) but is otherwise intact. 7/7 Admitted to SICU for neuro checks pending MRI and ETV vs Shunt revision       PMH:  PAST MEDICAL & SURGICAL HISTORY:  Borderline personality disorder in adult  Depression, unspecified depression type  Anxiety  Attention deficit hyperactivity disorder (ADHD), other type  Congenital hydrocephalus  S/P  shunt          ALLERGIES:  No Known Allergies      --------------------------------------------------------------------------------------    MEDICATIONS:    Neurologic Medications  fentaNYL    Injectable 25 MICROGram(s) IV Push every 5 minutes PRN Moderate Pain (4 - 6)  fentaNYL    Injectable 50 MICROGram(s) IV Push every 15 minutes PRN Severe Pain (7 - 10)  ondansetron Injectable 4 milliGRAM(s) IV Push once PRN Nausea and/or Vomiting    Respiratory Medications    Cardiovascular Medications    Gastrointestinal Medications  lactated ringers. 1000 milliLiter(s) IV Continuous <Continuous>    Genitourinary Medications    Hematologic/Oncologic Medications    Antimicrobial/Immunologic Medications    Endocrine/Metabolic Medications  insulin lispro (ADMELOG) corrective regimen sliding scale   SubCutaneous every 6 hours    Topical/Other Medications  chlorhexidine 4% Liquid 1 Application(s) Topical daily    --------------------------------------------------------------------------------------    VITAL SIGNS:  T(C): 36.8 (07-07-25 @ 12:16), Max: 36.9 (07-07-25 @ 03:48)  HR: 76 (07-07-25 @ 14:02) (76 - 103)  BP: 100/60 (07-07-25 @ 14:02) (83/64 - 109/72)  RR: 16 (07-07-25 @ 14:02) (16 - 25)  SpO2: 98% (07-07-25 @ 14:02) (96% - 98%)  --------------------------------------------------------------------------------------    INS AND OUTS:      --------------------------------------------------------------------------------------    EXAM    NEURO: NAD, alert, oriented, no gross focal deficits  HEENT: NC/AT  RESPIRATORY: nonlabored respirations, normal CW expansion  CARDIO: regular rate  ABDOMEN: soft, nontender, nondistended  EXTREMITIES: normal strength, no deformities    --------------------------------------------------------------------------------------    LABS    CBC Full  -  ( 07 Jul 2025 13:00 )  WBC Count : 7.88 K/uL  RBC Count : 3.72 M/uL  Hemoglobin : 12.1 g/dL  Hematocrit : 35.8 %  Platelet Count - Automated : 358 K/uL  Mean Cell Volume : 96.2 fl  Mean Cell Hemoglobin : 32.5 pg  Mean Cell Hemoglobin Concentration : 33.8 g/dL  Auto Neutrophil # : x  Auto Lymphocyte # : x  Auto Monocyte # : x  Auto Eosinophil # : x  Auto Basophil # : x  Auto Neutrophil % : x  Auto Lymphocyte % : x  Auto Monocyte % : x  Auto Eosinophil % : x  Auto Basophil % : x    07-07    135  |  101  |  9   ----------------------------<  92  3.8   |  21[L]  |  0.58    Ca    8.8      07 Jul 2025 13:00    TPro  7.2  /  Alb  4.1  /  TBili  0.6  /  DBili  x   /  AST  18  /  ALT  20  /  AlkPhos  35[L]  07-06    --------------------------------------------------------------------------------------

## 2025-07-08 ENCOUNTER — TRANSCRIPTION ENCOUNTER (OUTPATIENT)
Age: 35
End: 2025-07-08

## 2025-07-08 LAB
ALBUMIN SERPL ELPH-MCNC: 4.2 G/DL — SIGNIFICANT CHANGE UP (ref 3.3–5)
ALP SERPL-CCNC: 35 U/L — LOW (ref 40–120)
ALT FLD-CCNC: 13 U/L — SIGNIFICANT CHANGE UP (ref 4–33)
ANION GAP SERPL CALC-SCNC: 12 MMOL/L — SIGNIFICANT CHANGE UP (ref 7–14)
AST SERPL-CCNC: 12 U/L — SIGNIFICANT CHANGE UP (ref 4–32)
BILIRUB SERPL-MCNC: 0.6 MG/DL — SIGNIFICANT CHANGE UP (ref 0.2–1.2)
BLD GP AB SCN SERPL QL: NEGATIVE — SIGNIFICANT CHANGE UP
BLD GP AB SCN SERPL QL: NEGATIVE — SIGNIFICANT CHANGE UP
BUN SERPL-MCNC: 9 MG/DL — SIGNIFICANT CHANGE UP (ref 7–23)
CALCIUM SERPL-MCNC: 8.7 MG/DL — SIGNIFICANT CHANGE UP (ref 8.4–10.5)
CHLORIDE SERPL-SCNC: 104 MMOL/L — SIGNIFICANT CHANGE UP (ref 98–107)
CO2 SERPL-SCNC: 21 MMOL/L — LOW (ref 22–31)
CREAT SERPL-MCNC: 0.59 MG/DL — SIGNIFICANT CHANGE UP (ref 0.5–1.3)
EGFR: 121 ML/MIN/1.73M2 — SIGNIFICANT CHANGE UP
EGFR: 121 ML/MIN/1.73M2 — SIGNIFICANT CHANGE UP
GLUCOSE SERPL-MCNC: 93 MG/DL — SIGNIFICANT CHANGE UP (ref 70–99)
HCT VFR BLD CALC: 36.5 % — SIGNIFICANT CHANGE UP (ref 34.5–45)
HGB BLD-MCNC: 12.7 G/DL — SIGNIFICANT CHANGE UP (ref 11.5–15.5)
INR BLD: 1.04 RATIO — SIGNIFICANT CHANGE UP (ref 0.85–1.16)
MCHC RBC-ENTMCNC: 33.2 PG — SIGNIFICANT CHANGE UP (ref 27–34)
MCHC RBC-ENTMCNC: 34.8 G/DL — SIGNIFICANT CHANGE UP (ref 32–36)
MCV RBC AUTO: 95.5 FL — SIGNIFICANT CHANGE UP (ref 80–100)
NRBC # BLD AUTO: 0 K/UL — SIGNIFICANT CHANGE UP (ref 0–0)
NRBC # FLD: 0 K/UL — SIGNIFICANT CHANGE UP (ref 0–0)
NRBC BLD AUTO-RTO: 0 /100 WBCS — SIGNIFICANT CHANGE UP (ref 0–0)
PLATELET # BLD AUTO: 425 K/UL — HIGH (ref 150–400)
PMV BLD: 8.5 FL — SIGNIFICANT CHANGE UP (ref 7–13)
POTASSIUM SERPL-MCNC: 3.6 MMOL/L — SIGNIFICANT CHANGE UP (ref 3.5–5.3)
POTASSIUM SERPL-SCNC: 3.6 MMOL/L — SIGNIFICANT CHANGE UP (ref 3.5–5.3)
PROT SERPL-MCNC: 6.5 G/DL — SIGNIFICANT CHANGE UP (ref 6–8.3)
PROTHROM AB SERPL-ACNC: 12.4 SEC — SIGNIFICANT CHANGE UP (ref 9.9–13.4)
RBC # BLD: 3.82 M/UL — SIGNIFICANT CHANGE UP (ref 3.8–5.2)
RBC # FLD: 11.9 % — SIGNIFICANT CHANGE UP (ref 10.3–14.5)
RH IG SCN BLD-IMP: NEGATIVE — SIGNIFICANT CHANGE UP
RH IG SCN BLD-IMP: NEGATIVE — SIGNIFICANT CHANGE UP
SODIUM SERPL-SCNC: 137 MMOL/L — SIGNIFICANT CHANGE UP (ref 135–145)
WBC # BLD: 9.12 K/UL — SIGNIFICANT CHANGE UP (ref 3.8–10.5)
WBC # FLD AUTO: 9.12 K/UL — SIGNIFICANT CHANGE UP (ref 3.8–10.5)

## 2025-07-08 PROCEDURE — 70496 CT ANGIOGRAPHY HEAD: CPT | Mod: 26

## 2025-07-08 PROCEDURE — 77011 CT SCAN FOR LOCALIZATION: CPT | Mod: 26

## 2025-07-08 PROCEDURE — 70553 MRI BRAIN STEM W/O & W/DYE: CPT | Mod: 26

## 2025-07-08 DEVICE — SURGIFLO MATRIX WITH THROMBIN KIT: Type: IMPLANTABLE DEVICE | Status: FUNCTIONAL

## 2025-07-08 DEVICE — BONE WAX 2.5GM: Type: IMPLANTABLE DEVICE | Status: FUNCTIONAL

## 2025-07-08 DEVICE — CATH BLLN MINI COMP 60CM: Type: IMPLANTABLE DEVICE | Status: FUNCTIONAL

## 2025-07-08 DEVICE — TACHOSIL 4.8 X 4.8CM: Type: IMPLANTABLE DEVICE | Status: FUNCTIONAL

## 2025-07-08 DEVICE — SCREW UN3 AXS SELF DRILL 1.5X4MM: Type: IMPLANTABLE DEVICE | Status: FUNCTIONAL

## 2025-07-08 DEVICE — DURAGEN PLUS MATRIX 1 X 3": Type: IMPLANTABLE DEVICE | Status: FUNCTIONAL

## 2025-07-08 DEVICE — PLATE COVER BURRHOLE UN3 W/TAB 14MM: Type: IMPLANTABLE DEVICE | Status: FUNCTIONAL

## 2025-07-08 RX ORDER — HYDROMORPHONE/SOD CHLOR,ISO/PF 2 MG/10 ML
0.5 SYRINGE (ML) INJECTION
Refills: 0 | Status: DISCONTINUED | OUTPATIENT
Start: 2025-07-08 | End: 2025-07-08

## 2025-07-08 RX ORDER — DEXAMETHASONE 0.5 MG/1
TABLET ORAL
Refills: 0 | Status: DISCONTINUED | OUTPATIENT
Start: 2025-07-08 | End: 2025-07-09

## 2025-07-08 RX ORDER — DEXAMETHASONE 0.5 MG/1
4 TABLET ORAL EVERY 6 HOURS
Refills: 0 | Status: DISCONTINUED | OUTPATIENT
Start: 2025-07-08 | End: 2025-07-09

## 2025-07-08 RX ORDER — DEXAMETHASONE 0.5 MG/1
2 TABLET ORAL EVERY 24 HOURS
Refills: 0 | Status: CANCELLED | OUTPATIENT
Start: 2025-07-14 | End: 2025-07-09

## 2025-07-08 RX ORDER — DEXAMETHASONE 0.5 MG/1
4 TABLET ORAL EVERY 12 HOURS
Refills: 0 | Status: CANCELLED | OUTPATIENT
Start: 2025-07-11 | End: 2025-07-09

## 2025-07-08 RX ORDER — DEXAMETHASONE 0.5 MG/1
4 TABLET ORAL EVERY 8 HOURS
Refills: 0 | Status: DISCONTINUED | OUTPATIENT
Start: 2025-07-10 | End: 2025-07-09

## 2025-07-08 RX ORDER — LORAZEPAM 4 MG/ML
0.5 VIAL (ML) INJECTION ONCE
Refills: 0 | Status: DISCONTINUED | OUTPATIENT
Start: 2025-07-08 | End: 2025-07-08

## 2025-07-08 RX ORDER — OXYCODONE HYDROCHLORIDE 30 MG/1
5 TABLET ORAL EVERY 4 HOURS
Refills: 0 | Status: DISCONTINUED | OUTPATIENT
Start: 2025-07-08 | End: 2025-07-09

## 2025-07-08 RX ORDER — ONDANSETRON HCL/PF 4 MG/2 ML
4 VIAL (ML) INJECTION ONCE
Refills: 0 | Status: DISCONTINUED | OUTPATIENT
Start: 2025-07-08 | End: 2025-07-08

## 2025-07-08 RX ORDER — ACETAMINOPHEN 500 MG/5ML
1000 LIQUID (ML) ORAL EVERY 6 HOURS
Refills: 0 | Status: DISCONTINUED | OUTPATIENT
Start: 2025-07-08 | End: 2025-07-08

## 2025-07-08 RX ORDER — ALPRAZOLAM 0.5 MG
0.5 TABLET, EXTENDED RELEASE 24 HR ORAL ONCE
Refills: 0 | Status: DISCONTINUED | OUTPATIENT
Start: 2025-07-08 | End: 2025-07-08

## 2025-07-08 RX ORDER — HYDROMORPHONE/SOD CHLOR,ISO/PF 2 MG/10 ML
0.2 SYRINGE (ML) INJECTION ONCE
Refills: 0 | Status: DISCONTINUED | OUTPATIENT
Start: 2025-07-08 | End: 2025-07-08

## 2025-07-08 RX ORDER — HYDROMORPHONE/SOD CHLOR,ISO/PF 2 MG/10 ML
1 SYRINGE (ML) INJECTION
Refills: 0 | Status: DISCONTINUED | OUTPATIENT
Start: 2025-07-08 | End: 2025-07-08

## 2025-07-08 RX ORDER — DEXAMETHASONE 0.5 MG/1
4 TABLET ORAL EVERY 24 HOURS
Refills: 0 | Status: CANCELLED | OUTPATIENT
Start: 2025-07-13 | End: 2025-07-09

## 2025-07-08 RX ORDER — OXYCODONE HYDROCHLORIDE 30 MG/1
10 TABLET ORAL EVERY 4 HOURS
Refills: 0 | Status: DISCONTINUED | OUTPATIENT
Start: 2025-07-08 | End: 2025-07-09

## 2025-07-08 RX ORDER — MELATONIN 5 MG
6 TABLET ORAL AT BEDTIME
Refills: 0 | Status: DISCONTINUED | OUTPATIENT
Start: 2025-07-08 | End: 2025-07-09

## 2025-07-08 RX ORDER — CEFAZOLIN SODIUM IN 0.9 % NACL 3 G/100 ML
2000 INTRAVENOUS SOLUTION, PIGGYBACK (ML) INTRAVENOUS EVERY 8 HOURS
Refills: 0 | Status: COMPLETED | OUTPATIENT
Start: 2025-07-08 | End: 2025-07-08

## 2025-07-08 RX ORDER — DEXAMETHASONE 0.5 MG/1
1 TABLET ORAL EVERY 24 HOURS
Refills: 0 | Status: CANCELLED | OUTPATIENT
Start: 2025-07-15 | End: 2025-07-09

## 2025-07-08 RX ADMIN — Medication 4 MILLIGRAM(S): at 06:14

## 2025-07-08 RX ADMIN — Medication 100 MILLIGRAM(S): at 23:08

## 2025-07-08 RX ADMIN — Medication 0.2 MILLIGRAM(S): at 06:00

## 2025-07-08 RX ADMIN — DEXAMETHASONE 4 MILLIGRAM(S): 0.5 TABLET ORAL at 17:35

## 2025-07-08 RX ADMIN — DEXAMETHASONE 4 MILLIGRAM(S): 0.5 TABLET ORAL at 12:00

## 2025-07-08 RX ADMIN — Medication 0.5 MILLIGRAM(S): at 00:45

## 2025-07-08 RX ADMIN — Medication 0.5 MILLIGRAM(S): at 17:35

## 2025-07-08 RX ADMIN — SODIUM CHLORIDE 125 MILLILITER(S): 9 INJECTION, SOLUTION INTRAVENOUS at 05:25

## 2025-07-08 RX ADMIN — Medication 6 MILLIGRAM(S): at 21:06

## 2025-07-08 RX ADMIN — Medication 0.2 MILLIGRAM(S): at 05:23

## 2025-07-08 RX ADMIN — DEXAMETHASONE 4 MILLIGRAM(S): 0.5 TABLET ORAL at 23:10

## 2025-07-08 RX ADMIN — Medication 100 MILLIGRAM(S): at 15:16

## 2025-07-08 RX ADMIN — Medication 0.98 MICROGRAM(S)/KG/MIN: at 11:04

## 2025-07-08 NOTE — BRIEF OPERATIVE NOTE - OPERATION/FINDINGS
RP pt s/p ETV from Lt sergey hole -No issues, minimal blood loss -has a line, has espinosa, no drains -closed w/ Monocryl and steristrips -MRI wwo SRS today vs tmrw -dex 4q6 taper to off over 1wk, ancef postop -Extubated in room, FCx4, EOV, purposeful x4

## 2025-07-08 NOTE — PROGRESS NOTE ADULT - ASSESSMENT
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 Patient was having nausea, vomiting, and headaches that started yesterday, she went to [facility] ED where CTH last night showed hydrocephalus with no comparison imaging and patient was feeling better and neurologically intact. Pt was seen at outpatient neurosurgery office today with Dr. Garcia and was lethargic on exam. She had a shunt tap performed in office with 40cc CSF removed and she was sent to [facility] ER. Shunt series shows that the shunt catheter is broken in the neck with the distal tubing coiled in the abdomen.     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 Patient was having nausea, vomiting, and headaches that started yesterday, she went to [facility] ED where CTH last night showed hydrocephalus with no comparison imaging and patient was feeling better and neurologically intact. Pt was seen at outpatient neurosurgery office today with Dr. Garcia and was lethargic on exam. She had a shunt tap performed in office with 40cc CSF removed and she was sent to [facility] ER. Shunt series shows that the shunt catheter is broken in the neck with the distal tubing coiled in the abdomen.     7/7 Admitted for MRI and ETV vs Shunt revision   7/8 OR for endoscopic third ventriculostomy

## 2025-07-08 NOTE — DIETITIAN INITIAL EVALUATION ADULT - NAME AND PHONE
OBAntePartum Assessment Completed on: 09-Mar-2023 06:00 Roxi Dean MS, CLC, RDN  Pager #[phone], also available on Microsoft Teams.

## 2025-07-08 NOTE — PROGRESS NOTE ADULT - SUBJECTIVE AND OBJECTIVE BOX
NEUROSURGERY POST OP CHECK   07-08-25 @ 13:09    Dx: 35y Female s/p ETV  from Left sergey hole for hydrocephalus, doing well post op. Not c/o any headaches, nausea, vomiting. Patient pending post operative MRI brain w/ sedation.     MEDICATIONS  (STANDING):  ceFAZolin   IVPB 2000 milliGRAM(s) IV Intermittent every 8 hours  dexAMETHasone     Tablet 4 milliGRAM(s) Oral every 6 hours  dexAMETHasone     Tablet   Oral   phenylephrine    Infusion 0.1 MICROgram(s)/kG/Min (0.98 mL/Hr) IV Continuous <Continuous>  sodium chloride 0.9%. 1000 milliLiter(s) (75 mL/Hr) IV Continuous <Continuous>    MEDICATIONS  (PRN):  acetaminophen     Tablet .. 975 milliGRAM(s) Oral every 8 hours PRN Temp greater or equal to 38C (100.4F), Mild Pain (1 - 3)  ondansetron Injectable 4 milliGRAM(s) IV Push every 8 hours PRN Nausea and/or Vomiting  oxyCODONE    IR 5 milliGRAM(s) Oral every 4 hours PRN Moderate Pain (4 - 6)  oxyCODONE    IR 10 milliGRAM(s) Oral every 4 hours PRN Severe Pain (7 - 10)                          12.7   9.12  )-----------( 425      ( 08 Jul 2025 00:45 )             36.5     07-08    137  |  104  |  9   ----------------------------<  93  3.6   |  21[L]  |  0.59    Ca    8.7      08 Jul 2025 00:45    TPro  6.5  /  Alb  4.2  /  TBili  0.6  /  DBili  x   /  AST  12  /  ALT  13  /  AlkPhos  35[L]  07-08    I&O's Summary    07 Jul 2025 07:01  -  08 Jul 2025 07:00  --------------------------------------------------------  IN: 2079 mL / OUT: 1275 mL / NET: 804 mL    08 Jul 2025 07:01  -  08 Jul 2025 13:09  --------------------------------------------------------  IN: 307.7 mL / OUT: 385 mL / NET: -77.3 mL        T(C): 36.7 (07-08-25 @ 12:00), Max: 36.7 (07-08-25 @ 12:00)  HR: 64 (07-08-25 @ 12:00) (64 - 89)  BP: 113/64 (07-08-25 @ 12:00) (88/50 - 117/67)  RR: 21 (07-08-25 @ 12:00) (12 - 26)  SpO2: 100% (07-08-25 @ 12:00) (99% - 100%)    PHYSICAL EXAM:  AOx3, appropriate, follows commands  PERRL, EOMI, face symmetrical   KEENAN x 4 with good strength   Sensation intact to light touch   No pronator drift   Incision C/D/I

## 2025-07-08 NOTE — PROGRESS NOTE ADULT - ASSESSMENT
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 CTH done at [facility] overnight revealed enlargement of the third and lateral ventricles consistent with hydrocephalus and shunt malfunction. Pt. was seen at outpatient neurosurgery office 7/7with Dr. Garcia and was lethargic on exam. She had a shunt tap performed in office with 40cc removed. Patient is now alert and oriented x 3 with some deficits in memory (i.e cannot remember being seen in outpatient neurosurgery office today) but is otherwise intact. Now admitted to SICU for q1 neuro checks, with plan for OR with NSGY 7/8.    NEUROLOGIC   - Pain control: Tylenol  q8H  - f/u CT stereotactic localization under sedation w/ ativan  - plan for ETV vs. 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 CTH done at [facility] overnight revealed enlargement of the third and lateral ventricles consistent with hydrocephalus and shunt malfunction. Pt. was seen at outpatient neurosurgery office 7/7with Dr. Garcia and was lethargic on exam. She had a shunt tap performed in office with 40cc removed. Patient is now alert and oriented x 3 with some deficits in memory (i.e cannot remember being seen in outpatient neurosurgery office today) but is otherwise intact. Now admitted to SICU for q1 neuro checks. S/p ETV 7/8.     NEUROLOGIC   - Pain control: Tylenol  q8H and Oxy PRN  - f/u CT stereotactic localization under sedation w/ ativan  - s/p ETV (7/8) for hydrocephalus   - plan for MRI f/u   - dexamethasone taper per neurosurgery   - neuro checks q1h  - appreciate neuro recs    RESPIRATORY   - Monitor SpO2 goal >92%    CARDIOVASCULAR   - on coy gtt  - Monitor hemodynamics     GASTROINTESTINAL   - Diet: NPO    /RENAL   - IV fluids: NS @ 75ml/hr  - Monitor electrolytes, replete PRN    HEMATOLOGIC  - Monitor H/H   - SCDs for DVT ppx    INFECTIOUS DISEASE  - Monitor fever / WBC  - Cefazolin x2 for post-operative ppx    ENDOCRINE  - Monitor gluc  - dexamethasone taper for 7d    LINES  - PIV     DISPO: SICU  --------------------------------------------------------------------------------------     ASSESSMENT: 35yoF with VPS (Delta valve set at 1.5) 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 CTH done at [facility] overnight revealed enlargement of the third and lateral ventricles consistent with hydrocephalus and shunt malfunction. Pt. was seen at outpatient neurosurgery office 7/7with Dr. Garcia and was lethargic on exam. She had a shunt tap performed in office with 40cc removed. Patient is now alert and oriented x 3 with some deficits in memory (i.e cannot remember being seen in outpatient neurosurgery office today) but is otherwise intact. Now admitted to SICU for q1 neuro checks. S/p ETV 7/8.     NEUROLOGIC   - Pain control: Tylenol  q8H and Oxy PRN  - f/u CT stereotactic localization under sedation w/ ativan  - s/p ETV (7/8) for hydrocephalus   - plan for MRI f/u   - dexamethasone taper per neurosurgery   - neuro checks q1h  - appreciate neuro recs    RESPIRATORY   - Monitor SpO2 goal >92%  - on room air     CARDIOVASCULAR   - on coy gtt - continue to wean as tolerated   - Monitor hemodynamics     GASTROINTESTINAL   - Diet: NPO    /RENAL   - IV fluids: NS @ 75ml/hr  - Monitor electrolytes, replete PRN    HEMATOLOGIC  - Monitor H/H   - SCDs for DVT ppx    INFECTIOUS DISEASE  - Monitor fever / WBC  - Cefazolin x2 for post-operative ppx    ENDOCRINE  - Monitor gluc  - dexamethasone taper for 7d    LINES  - PIV     DISPO: SICU  -------------------------------------------------------------------------------------- ASSESSMENT: 35yoF with VPS (Delta valve set at 1.5) 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 CTH done at [facility] overnight revealed enlargement of the third and lateral ventricles consistent with hydrocephalus and shunt malfunction. Pt. was seen at outpatient neurosurgery office 7/7with Dr. Garcia and was lethargic on exam. She had a shunt tap performed in office with 40cc removed. Patient is now alert and oriented x 3 with some deficits in memory (i.e cannot remember being seen in outpatient neurosurgery office today) but is otherwise intact. Now admitted to SICU for q1 neuro checks. S/p ETV 7/8.     NEUROLOGIC   - Pain control: Tylenol  q8H and Oxy PRN  - f/u CT stereotactic localization under sedation w/ ativan  - s/p ETV (7/8) for hydrocephalus   - plan for MRI f/u (0.5 xanax for sedation per anesthesia)  - dexamethasone taper per neurosurgery   - neuro checks q1h  - appreciate neuro recs    RESPIRATORY   - Monitor SpO2 goal >92%  - on room air     CARDIOVASCULAR   - on coy gtt - continue to wean as tolerated   - Monitor hemodynamics     GASTROINTESTINAL   - Diet: NPO    /RENAL   - IV fluids: NS @ 75ml/hr  - Monitor electrolytes, replete PRN    HEMATOLOGIC  - Monitor H/H   - SCDs for DVT ppx    INFECTIOUS DISEASE  - Monitor fever / WBC  - Cefazolin x2 for post-operative ppx    ENDOCRINE  - Monitor gluc  - dexamethasone taper for 7d    LINES  - PIV     DISPO: SICU  --------------------------------------------------------------------------------------

## 2025-07-08 NOTE — PATIENT PROFILE ADULT - NSPROHMSYMPCOND_GEN_A_NUR
Visit Information Date & Time Provider Department Dept. Phone Encounter #  
 2/3/2017 10:20 AM Jean Paul Duran NP [address] [facility] [phone] [id number] Follow-up Instructions Return in about 4 weeks (around 3/3/2017) for pain med refill. Upcoming Health Maintenance Date Due Pneumococcal 19-64 Medium Risk (1 of 1 - PPSV23) 2/5/2017* BREAST CANCER SCRN MAMMOGRAM 2/5/2017* PAP AKA CERVICAL CYTOLOGY 2/5/2017* FOBT Q 1 YEAR AGE 50-75 5/26/2017 DTaP/Tdap/Td series (2 - Td) 7/6/2025 *Topic was postponed. The date shown is not the original due date. Allergies as of 2/3/2017  Review Complete On: 1/6/2017 By: Jean Paul Duran NP Severity Noted Reaction Type Reactions Mold  05/23/2016    Shortness of Breath, Itching Ibuprofen Low 03/30/2015   Intolerance Nausea Only Current Immunizations  Reviewed on 12/9/2016 Name Date Influenza Vaccine (Quad) Intradermal PF 12/9/2016 Not reviewed this visit You Were Diagnosed With   
  
 Codes Comments Primary osteoarthritis of right knee     ICD-10-CM: M17.11 ICD-9-CM: 715.16 Status post total right knee replacement     ICD-10-CM: J57.539 ICD-9-CM: V43.65 Right sided sciatica     ICD-10-CM: M54.31 
ICD-9-CM: 724.3 Midline low back pain with right-sided sciatica, unspecified chronicity     ICD-10-CM: M54.41 
ICD-9-CM: 724.3 Vitals BP Pulse Temp Resp Height(growth percentile) Weight(growth percentile) 122/80 (BP 1 Location: Right arm, BP Patient Position: Sitting) 74 97.9 °F (36.6 °C) (Oral) 18 5' 1\" (1.549 m) 170 lb (77.1 kg) LMP BMI OB Status Smoking Status 09/14/2016 (Approximate) 32.12 kg/m2 Postmenopausal Former Smoker Vitals History BMI and BSA Data Body Mass Index Body Surface Area  
 32.12 kg/m 2 1.82 m 2 Preferred Pharmacy Pharmacy Name Phone [id number] [phone] Your Updated Medication List  
  
   
This list is accurate as of: 2/3/17 11:19 AM.  Always use your most recent med list.  
  
  
  
  
 acetaminophen 650 mg CR tablet Commonly known as:  TYLENOL Take 1 Tab by mouth three (3) times daily as needed for Pain. amLODIPine 5 mg tablet Commonly known as:  Get Ruths TAKE 1 TABLET BY MOUTH EVERY DAY  
  
 diazePAM 5 mg tablet Commonly known as:  VALIUM  
  
 DULoxetine 30 mg capsule Commonly known as:  CYMBALTA Take 1 Cap by mouth daily. Indications: CHRONIC MUSCULOSKELETAL PAIN  
  
 fluticasone 50 mcg/actuation nasal spray Commonly known as:  Claire Fryer 2 Sprays by Both Nostrils route daily. gabapentin 300 mg capsule Commonly known as:  NEURONTIN Take 1 Cap by mouth three (3) times daily. Indications: right sciatica  
  
 hydroCHLOROthiazide 25 mg tablet Commonly known as:  HYDRODIURIL  
daily. levothyroxine 125 mcg tablet Commonly known as:  SYNTHROID Take 1 Tab by mouth Daily (before breakfast). lidocaine 5 % Commonly known as:  Delayne Matter Apply patch to the affected area for 12 hours a day and remove for 12 hours a day. loratadine 10 mg tablet Commonly known as:  Ronda Dustman Take 1 Tab by mouth daily. methocarbamol 750 mg tablet Commonly known as:  RFDKNYM-585 Take 1 Tab by mouth four (4) times daily as needed for Pain (spasms). miscellaneous medical supply Misc Shower seat for chronic knee pain, pt planning to have right TKR in June due to condition. Very limited range of motion. montelukast 10 mg tablet Commonly known as:  SINGULAIR  
daily. ondansetron 4 mg disintegrating tablet Commonly known as:  ZOFRAN ODT Take 1 Tab by mouth every eight (8) hours as needed for Nausea. oxyCODONE-acetaminophen  mg per tablet Commonly known as:  PERCOCET 10 Take 1 Tab by mouth daily as needed for Pain. Indications: Pain  
  
 pantoprazole 40 mg tablet Commonly known as:  PROTONIX  
two (2) times a day. * PROAIR HFA 90 mcg/actuation inhaler Generic drug:  albuterol Take 2 Puffs by inhalation every four (4) hours as needed. * albuterol 2.5 mg /3 mL (0.083 %) nebulizer solution Commonly known as:  PROVENTIL VENTOLIN  
by Nebulization route three (3) times daily. SYMBICORT 160-4.5 mcg/actuation HFA inhaler Generic drug:  budesonide-formoterol Take 2 Puffs by inhalation two (2) times a day. * Notice: This list has 2 medication(s) that are the same as other medications prescribed for you. Read the directions carefully, and ask your doctor or other care provider to review them with you. Prescriptions Printed Refills  
 oxyCODONE-acetaminophen (PERCOCET 10)  mg per tablet 0 Sig: Take 1 Tab by mouth daily as needed for Pain. Indications: Pain Class: Print Route: Oral  
  
We Performed the Following PAIN MGMT PANEL W/REFL, UR [[id number] Custom] Follow-up Instructions Return in about 4 weeks (around 3/3/2017) for pain med refill. Patient Instructions Back Care and Preventing Injuries: Care Instructions Your Care Instructions You can hurt your back doing many everyday activities: lifting a heavy box, bending down to garden, exercising at the gym, and even getting out of bed. But you can keep your back strong and healthy by doing some exercises. You also can follow a few tips for sitting, sleeping, and lifting to avoid hurting your back again. Talk to your doctor before you start an exercise program. Ask for help if you want to learn more about keeping your back healthy. Follow-up care is a key part of your treatment and safety. Be sure to make and go to all appointments, and call your doctor if you are having problems. It's also a good idea to know your test results and keep a list of the medicines you take. How can you care for yourself at home? · Stay at a healthy weight to avoid strain on your lower back. · Do not smoke. Smoking increases the risk of osteoporosis, which weakens the spine. If you need help quitting, talk to your doctor about stop-smoking programs and medicines. These can increase your chances of quitting for good. · Make sure you sleep in a position that maintains your back's normal curves and on a mattress that feels comfortable. Sleep on your side with a pillow between your knees, or sleep on your back with a pillow under your knees. These positions can reduce strain on your back. · When you get out of bed, lie on your side and bend both knees. Drop your feet over the edge of the bed as you push up with both arms. Scoot to the edge of the bed. Make sure your feet are in line with your rear end (buttocks), and then stand up. · If you must stand for a long time, put one foot on a stool, ledge, or box. Exercise to strengthen your back and other muscles · Get at least 30 minutes of exercise on most days of the week. Walking is a good choice. You also may want to do other activities, such as running, swimming, cycling, or playing tennis or team sports. · Stretch your back muscles. Here are few exercises to try: ¨ Lie on your back with your knees bent and your feet flat on the floor. Gently pull one bent knee to your chest. Put that foot back on the floor, and then pull the other knee to your chest. Hold for 15 to 30 seconds. Repeat 2 to 4 times. ¨ Do pelvic tilts. Lie on your back with your knees bent. Tighten your stomach muscles. Pull your belly button (navel) in and up toward your ribs. You should feel like your back is pressing to the floor and your hips and pelvis are slightly lifting off the floor. Hold for 6 seconds while breathing smoothly. · Keep your core muscles strong. The muscles of your back, belly (abdomen), and buttocks support your spine. ¨ Pull in your belly, and imagine pulling your navel toward your spine. Hold this for 6 seconds, then relax. Remember to keep breathing normally as you tense your muscles. ¨ Do curl-ups. Always do them with your knees bent. Keep your low back on the floor, and curl your shoulders toward your knees using a smooth, slow motion. Keep your arms folded across your chest. If this bothers your neck, try putting your hands behind your neck (not your head), with your elbows spread apart. ¨ Lie on your back with your knees bent and your feet flat on the floor. Tighten your belly muscles, and then push with your feet and raise your buttocks up a few inches. Hold this position 6 seconds as you continue to breathe normally, then lower yourself slowly to the floor. Repeat 8 to 12 times. ¨ If you like group exercise, try Pilates or yoga. These classes have poses that strengthen the core muscles. Protect your back when you sit · Place a small pillow, a rolled-up towel, or a lumbar roll in the curve of your back if you need extra support. · Sit in a chair that is low enough to let you place both feet flat on the floor with both knees nearly level with your hips. If your chair or desk is too high, use a foot rest to raise your knees. · When driving, keep your knees nearly level with your hips. Sit straight, and drive with both hands on the steering wheel. Your arms should be in a slightly bent position. · Try a kneeling chair, which helps tilt your hips forward. This takes pressure off your lower back. · Try sitting on an exercise ball. It can rock from side to side, which helps keep your back loose. Lift properly · Squat down, bending at the hips and knees only. If you need to, put one knee to the floor and extend your other knee in front of you, bent at a right angle (half kneeling). · Press your chest straight forward. This helps keep your upper back straight while keeping a slight arch in your low back. · Hold the load as close to your body as possible, at the level of your navel. · Use your feet to change direction, taking small steps. · Lead with your hips as you change direction. Keep your shoulders in line with your hips as you move. Do not twist your body. · Set down your load carefully, squatting with your knees and hips only. When should you call for help? Watch closely for changes in your health, and be sure to contact your doctor if: 
· You want more exercises to make your back and other core muscles stronger. Where can you learn more? Go to http://ofelia-mindy.info/. Enter S810 in the search box to learn more about \"Back Care and Preventing Injuries: Care Instructions. \" Current as of: May 23, 2016 Content Version: 11.1 © 4236-5187 [employer]. Care instructions adapted under license by [employer] (which disclaims liability or warranty for this information). If you have questions about a medical condition or this instruction, always ask your healthcare professional. [address] any warranty or liability for your use of this information. Introducing [facility]! Pacheco Cook introduces eSentire patient portal. Now you can access parts of your medical record, email your doctor's office, and request medication refills online. 1. In your internet browser, go to [email or web address] 2. Click on the First Time User? Click Here link in the Sign In box. You will see the New Member Sign Up page. 3. Enter your eSentire Access Code exactly as it appears below. You will not need to use this code after youve completed the sign-up process. If you do not sign up before the expiration date, you must request a new code. · eSentire Access Code: [email or web address] Expires: 3/30/2017  8:27 AM 
 
4. Enter the last four digits of your Social Security Number (xxxx) and Date of Birth (mm/dd/yyyy) as indicated and click Submit. You will be taken to the next sign-up page. 5. Create a Omedix ID. This will be your Omedix login ID and cannot be changed, so think of one that is secure and easy to remember. 6. Create a Omedix password. You can change your password at any time. 7. Enter your Password Reset Question and Answer. This can be used at a later time if you forget your password. 8. Enter your e-mail address. You will receive e-mail notification when new information is available in [address] 9. Click Sign Up. You can now view and download portions of your medical record. 10. Click the Download Summary menu link to download a portable copy of your medical information. If you have questions, please visit the Frequently Asked Questions section of the [employer] website. Remember, Omedix is NOT to be used for urgent needs. For medical emergencies, dial 911. Now available from your iPhone and Android! Please provide this summary of care documentation to your next provider. Your primary care clinician is listed as HUGO Thayer. If you have any questions after today's visit, please call [phone]. neurologic

## 2025-07-08 NOTE — CHART NOTE - NSCHARTNOTEFT_GEN_A_CORE
Called by SICU, patient having extreme headache, rated 10/10, with associated episodes of vomiting x4-5. Given 0.2mg IV Dilaudid STAT.  On exam, AOx2 (to name/place), holding head, PERRL, EOMI, KEENAN x 4 with good strength    Given clinical status and already having to tap shunt multiple times over past 12 hours, decision was made to take patient to OR emergently for ETV. Called by SICU, patient having extreme headache, rated 10/10, with associated episodes of vomiting x4-5. Given 0.2mg IV Dilaudid STAT.  On exam, AOx2 (to name/place), holding head, agitated, intermittently FC, PERRL, EOMI, KEENAN x 4 with good strength    Given clinical status and already having to tap shunt multiple times over past 12 hours, decision was made to take patient to OR emergently for ETV.

## 2025-07-08 NOTE — PATIENT PROFILE ADULT - FALL HARM RISK - FACTORS
Impaired gait/IV and/or equipment tethered to patient/Medication side effects/Poor balance/Post procedure/Weakness

## 2025-07-08 NOTE — PROGRESS NOTE ADULT - ASSESSMENT
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 Patient was having nausea, vomiting, and headaches that started yesterday, she went to [facility] ED where CTH last night showed hydrocephalus with no comparison imaging and patient was feeling better and neurologically intact. Pt was seen at outpatient neurosurgery office today with Dr. Garcia and was lethargic on exam. She had a shunt tap performed in office with 40cc CSF removed and she was sent to [facility] ER. Shunt series shows that the shunt catheter is broken in the neck with the distal tubing coiled in the abdomen.     7/7 Admitted for MRI and ETV vs Shunt revision   7/8 OR for endoscopic third ventriculostomy

## 2025-07-08 NOTE — PATIENT PROFILE ADULT - FUNCTIONAL ASSESSMENT - DAILY ACTIVITY 3.
MEDICATIONS  (STANDING):  acetaminophen   Tablet .. 650 milliGRAM(s) Oral every 6 hours  ALBUTerol/ipratropium for Nebulization 3 milliLiter(s) Nebulizer every 6 hours  aspirin  chewable 81 milliGRAM(s) Oral daily  buDESOnide 160 MICROgram(s)/formoterol 4.5 MICROgram(s) Inhaler 2 Puff(s) Inhalation two times a day  buPROPion XL . 150 milliGRAM(s) Oral daily  cholecalciferol 1000 Unit(s) Oral daily  dextrose 5%. 1000 milliLiter(s) (50 mL/Hr) IV Continuous <Continuous>  dextrose 50% Injectable 12.5 Gram(s) IV Push once  dextrose 50% Injectable 25 Gram(s) IV Push once  dextrose 50% Injectable 25 Gram(s) IV Push once  DULoxetine 60 milliGRAM(s) Oral daily  famotidine    Tablet 20 milliGRAM(s) Oral two times a day  insulin lispro (HumaLOG) corrective regimen sliding scale   SubCutaneous three times a day before meals  insulin lispro (HumaLOG) corrective regimen sliding scale   SubCutaneous at bedtime  levothyroxine 100 MICROGram(s) Oral daily  meropenem  IVPB 1000 milliGRAM(s) IV Intermittent every 8 hours  metoprolol tartrate 12.5 milliGRAM(s) Oral two times a day  midodrine 15 milliGRAM(s) Oral three times a day  multivitamin 1 Tablet(s) Oral daily  nystatin Powder 1 Application(s) Topical two times a day  oxyCODONE  ER Tablet 10 milliGRAM(s) Oral every 12 hours  sertraline 100 milliGRAM(s) Oral daily  simvastatin 20 milliGRAM(s) Oral at bedtime  warfarin 5 milliGRAM(s) Oral once    MEDICATIONS  (PRN):  dextrose 40% Gel 15 Gram(s) Oral once PRN Blood Glucose LESS THAN 70 milliGRAM(s)/deciliter  glucagon  Injectable 1 milliGRAM(s) IntraMuscular once PRN Glucose LESS THAN 70 milligrams/deciliter  LORazepam     Tablet 0.5 milliGRAM(s) Oral every 6 hours PRN Anxiety 4 = No assist / stand by assistance

## 2025-07-08 NOTE — DIETITIAN INITIAL EVALUATION ADULT - REASON
Pt shows no overt all signs of fat loss and muscle wasting. As per pt's dad, pt is always slim and petite.

## 2025-07-08 NOTE — DIETITIAN INITIAL EVALUATION ADULT - OTHER INFO
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 Patient was having nausea, vomiting, and headaches that started yesterday, she went to [facility] ED where CTH last night showed hydrocephalus with no comparison imaging and patient was feeling better and neurologically intact. Pt was seen at outpatient neurosurgery office today with Dr. Garcia and was lethargic on exam. She had a shunt tap performed in office with 40cc CSF removed and she was sent to [facility] ER. Shunt series shows that the shunt catheter is broken in the neck with the distal tubing coiled in the abdomen.     Met with patient and her parents at bedside. Pt noted currently NPO x 1 day pending for MRI. Pt noted nausea and vomiting last night, now has resolved. IV hydration ordered. Pt denies any headache or any GI distress at this time. Denies any GI distress (nausea/vomiting/diarrhea/constipation.) however pt unable to recall her LBM. Recommend cont. monitor BM and document in RN flow sheet. Nutrition education is inappropriate at this time given pt with NPO. Pt is at risk for malnutrition given inadequate oral intake for two days. Recommend when medically feasible resume PO diet. RD to remain available for further nutritional interventions as indicated.

## 2025-07-08 NOTE — PATIENT PROFILE ADULT - DO YOU EVER NEED HELP READING HOSPITAL MATERIALS?
[Patient High Risk] : the patient is a high surgical risk [FreeTextEntry4] : 56 year old female found to have stable Hypertension, Hypothyroidism, Type 2 Diabetes Mellitus, Reactive Airway Disease, Morbid Obesity, Asthma, Hypothyroidism, with the current regimen, diet and life style modifications, as counseled. Prior results reviewed and discussed with the patient during today's examination. Plan as ordered.\par Possible Neurosurgery as per surgeon, for Trigeminal Neuralgia.\par EKG showed known Sinus Tachycardia at the rate of 108 BPM, non-sp ST-T changes noted.\par Pulmonary clearance, as counseled. no

## 2025-07-08 NOTE — PROCEDURE NOTE - ADDITIONAL PROCEDURE DETAILS
Shunt tapped under sterile technique using 23G butterfly needle, 40cc CSF aspirated without issue, came off easily. Patient tolerated procedure well.
shunt tapped under sterile technique using 23G butterfly needle, 30cc CSF aspirated without issue, came off easily. Patient tolerated procedure well.

## 2025-07-08 NOTE — DIETITIAN INITIAL EVALUATION ADULT - ORAL INTAKE PTA/DIET HISTORY
Pt's parents provided diet hx due to pt unable to recall much. As per dad, pt has been following a very "healthy and clean diet", her diet contains a variety of protein, fruits and vegetable daily. As per pt's dad, pt has good appetite in general up until few days ago pt with nausea, vomiting, and heache. As per dad, pt's UBW~113 lbs. Most recent adm weight 115.5lbs.

## 2025-07-08 NOTE — PROGRESS NOTE ADULT - SUBJECTIVE AND OBJECTIVE BOX
PAST 24HR EVENTS:  Patient seen and examined at bedside with mother. MR overnight unable to be tolerated, pt was moving and become bradycardiac on precedex. Shunt tapped at bedside at 12:30AM, 40 cc taken off easily. CTH/CTA to be done this morning. NPO for OR today.     Vital Signs Last 24 Hrs  T(C): 36.9 (08 Jul 2025 00:00), Max: 37.1 (07 Jul 2025 18:10)  T(F): 98.4 (08 Jul 2025 00:00), Max: 98.8 (07 Jul 2025 18:10)  HR: 62 (08 Jul 2025 00:00) (45 - 103)  BP: 88/67 (08 Jul 2025 00:00) (81/56 - 107/64)  BP(mean): 75 (08 Jul 2025 00:00) (59 - 83)  RR: 14 (08 Jul 2025 00:00) (14 - 26)  SpO2: 100% (08 Jul 2025 00:00) (96% - 100%)    Parameters below as of 07 Jul 2025 20:00  Patient On (Oxygen Delivery Method): room air    MEDS:   acetaminophen     Tablet .. 975 milliGRAM(s) Oral every 8 hours PRN  lactated ringers. 1000 milliLiter(s) IV Continuous <Continuous>  LORazepam   Injectable 0.5 milliGRAM(s) IV Push once  LORazepam   Injectable 0.5 milliGRAM(s) IV Push once  ondansetron Injectable 4 milliGRAM(s) IV Push every 8 hours PRN  phenylephrine    Infusion 0.1 MICROgram(s)/kG/Min IV Continuous <Continuous>      LABS:                        12.1   7.88  )-----------( 358      ( 07 Jul 2025 13:00 )             35.8     07-07    135  |  101  |  9   ----------------------------<  92  3.8   |  21[L]  |  0.58    Ca    8.8      07 Jul 2025 13:00    TPro  7.2  /  Alb  4.1  /  TBili  0.6  /  DBili  x   /  AST  18  /  ALT  20  /  AlkPhos  35[L]  07-06    PT/INR - ( 07 Jul 2025 13:00 )   PT: 12.2 sec;   INR: 1.03 ratio       PTT - ( 07 Jul 2025 13:00 )  PTT:28.0 sec      PHYSICAL EXAM:  AOx3, Sleepy but arousable  PERRL, EOMI, face symmetrical   KEENAN x 4 with good strength   No pronator drift

## 2025-07-08 NOTE — PATIENT PROFILE ADULT - FALL HARM RISK - HARM RISK INTERVENTIONS
Assistance with ambulation/Assistance OOB with selected safe patient handling equipment/Communicate Risk of Fall with Harm to all staff/Discuss with provider need for PT consult/Monitor for mental status changes/Monitor gait and stability/Move patient closer to nurses' station/Reinforce activity limits and safety measures with patient and family/Reorient to person, place and time as needed/Review medications for side effects contributing to fall risk/Sit up slowly, dangle for a short time, stand at bedside before walking/Tailored Fall Risk Interventions/Toileting schedule using arm’s reach rule for commode and bathroom/Use of alarms - bed, chair and/or voice tab/Visual Cue: Yellow wristband and red socks/Bed in lowest position, wheels locked, appropriate side rails in place/Call bell, personal items and telephone in reach/Instruct patient to call for assistance before getting out of bed or chair/Non-slip footwear when patient is out of bed/[place] to call system/Physically safe environment - no spills, clutter or unnecessary equipment/Purposeful Proactive Rounding/Room/bathroom lighting operational, light cord in reach
paper hand off sent up with patient chart. 7 uris unit notified of patients dispatched transport.

## 2025-07-08 NOTE — DIETITIAN INITIAL EVALUATION ADULT - PERTINENT LABORATORY DATA
07-08    137  |  104  |  9   ----------------------------<  93  3.6   |  21[L]  |  0.59    Ca    8.7      08 Jul 2025 00:45    TPro  6.5  /  Alb  4.2  /  TBili  0.6  /  DBili  x   /  AST  12  /  ALT  13  /  AlkPhos  35[L]  07-08

## 2025-07-08 NOTE — PROGRESS NOTE ADULT - SUBJECTIVE AND OBJECTIVE BOX
SICU Daily Progress Note  =====================================================  INTERVAL EVENTS  - ativan .5mg for CTH/CTA ON  - shunt tapped by NSGY, 40cc removed prior to CT  - precedex gtt and ativan .5mg for unsuccessful 2nd attempt MRI brain ON, coy started  - shunt tapped by NSGY, 30cc removed  - ativan 1mg for agitation to attempt MRI brain      MEDICATIONS:   --------------------------------------------------------------------------------------  Neurologic Medications  acetaminophen     Tablet .. 975 milliGRAM(s) Oral every 8 hours PRN Temp greater or equal to 38C (100.4F), Mild Pain (1 - 3)  LORazepam   Injectable 0.5 milliGRAM(s) IV Push once  LORazepam   Injectable 0.5 milliGRAM(s) IV Push once  ondansetron Injectable 4 milliGRAM(s) IV Push every 8 hours PRN Nausea and/or Vomiting    Respiratory Medications    Cardiovascular Medications  phenylephrine    Infusion 0.1 MICROgram(s)/kG/Min IV Continuous <Continuous>    Gastrointestinal Medications  lactated ringers. 1000 milliLiter(s) IV Continuous <Continuous>    Genitourinary Medications    Hematologic/Oncologic Medications    Antimicrobial/Immunologic Medications    Endocrine/Metabolic Medications    Topical/Other Medications    --------------------------------------------------------------------------------------    VITAL SIGNS, INS/OUTS (last 24 hours):  --------------------------------------------------------------------------------------    07-07-25 @ 07:01  -  07-08-25 @ 01:37  --------------------------------------------------------  IN: 988 mL / OUT: 400 mL / NET: 588 mL      --------------------------------------------------------------------------------------    EXAM    NEURO: NAD, alert, oriented, no gross focal deficits  HEENT: NC/AT  RESPIRATORY: nonlabored respirations, normal CW expansion  CARDIO: regular rate  ABDOMEN: soft, nontender, nondistended  EXTREMITIES: normal strength, no deformities    METABOLIC/FLUIDS/ELECTROLYTES  lactated ringers. 1000 milliLiter(s) IV Continuous <Continuous>      HEMATOLOGIC  [x] VTE Prophylaxis:       LABS  --------------------------------------------------------------------------------------    T(C): 36.9 (07-08-25 @ 00:00), Max: 37.1 (07-07-25 @ 18:10)  HR: 62 (07-08-25 @ 00:00) (45 - 103)  BP: 88/67 (07-08-25 @ 00:00) (81/56 - 107/64)  RR: 14 (07-08-25 @ 00:00) (14 - 26)  SpO2: 100% (07-08-25 @ 00:00) (96% - 100%)    --------------------------------------------------------------------------------------   SICU Daily Progress Note  =====================================================  INTERVAL EVENTS    - ativan 1mg for agitation to attempt MRI brain  - shunt tapped by NSGY, 30cc removed  - precedex gtt and ativan .5mg for unsuccessful 2nd attempt MRI brain ON, coy started  - shunt tapped by NSGY, 40cc removed prior to CT  - ativan .5mg for CTH/CTA ON        MEDICATIONS:   --------------------------------------------------------------------------------------  Neurologic Medications  acetaminophen     Tablet .. 975 milliGRAM(s) Oral every 8 hours PRN Temp greater or equal to 38C (100.4F), Mild Pain (1 - 3)  LORazepam   Injectable 0.5 milliGRAM(s) IV Push once  LORazepam   Injectable 0.5 milliGRAM(s) IV Push once  ondansetron Injectable 4 milliGRAM(s) IV Push every 8 hours PRN Nausea and/or Vomiting    Respiratory Medications    Cardiovascular Medications  phenylephrine    Infusion 0.1 MICROgram(s)/kG/Min IV Continuous <Continuous>    Gastrointestinal Medications  lactated ringers. 1000 milliLiter(s) IV Continuous <Continuous>    Genitourinary Medications    Hematologic/Oncologic Medications    Antimicrobial/Immunologic Medications    Endocrine/Metabolic Medications    Topical/Other Medications    --------------------------------------------------------------------------------------    VITAL SIGNS, INS/OUTS (last 24 hours):  --------------------------------------------------------------------------------------    07-07-25 @ 07:01  -  07-08-25 @ 01:37  --------------------------------------------------------  IN: 988 mL / OUT: 400 mL / NET: 588 mL      --------------------------------------------------------------------------------------    EXAM    NEURO: NAD, alert, oriented, no gross focal deficits  HEENT: NC/AT  RESPIRATORY: nonlabored respirations, normal CW expansion  CARDIO: regular rate  ABDOMEN: soft, nontender, nondistended  EXTREMITIES: normal strength, no deformities    METABOLIC/FLUIDS/ELECTROLYTES  lactated ringers. 1000 milliLiter(s) IV Continuous <Continuous>      HEMATOLOGIC  [x] VTE Prophylaxis:       LABS  --------------------------------------------------------------------------------------    T(C): 36.9 (07-08-25 @ 00:00), Max: 37.1 (07-07-25 @ 18:10)  HR: 62 (07-08-25 @ 00:00) (45 - 103)  BP: 88/67 (07-08-25 @ 00:00) (81/56 - 107/64)  RR: 14 (07-08-25 @ 00:00) (14 - 26)  SpO2: 100% (07-08-25 @ 00:00) (96% - 100%)    --------------------------------------------------------------------------------------   SICU Daily Progress Note  =====================================================  INTERVAL EVENTS    - ativan 1mg for agitation to attempt MRI brain  - shunt tapped by NSGY, 30cc removed  - precedex gtt and ativan .5mg for unsuccessful 2nd attempt MRI brain ON, coy started  - shunt tapped by NSGY, 40cc removed prior to CT  - ativan .5mg for CTH/CTA ON  - emergent ETV procedure in the am (7/8)         MEDICATIONS:   --------------------------------------------------------------------------------------  Neurologic Medications  acetaminophen     Tablet .. 975 milliGRAM(s) Oral every 8 hours PRN Temp greater or equal to 38C (100.4F), Mild Pain (1 - 3)  LORazepam   Injectable 0.5 milliGRAM(s) IV Push once  LORazepam   Injectable 0.5 milliGRAM(s) IV Push once  ondansetron Injectable 4 milliGRAM(s) IV Push every 8 hours PRN Nausea and/or Vomiting    Respiratory Medications    Cardiovascular Medications  phenylephrine    Infusion 0.1 MICROgram(s)/kG/Min IV Continuous <Continuous>    Gastrointestinal Medications  lactated ringers. 1000 milliLiter(s) IV Continuous <Continuous>    Genitourinary Medications    Hematologic/Oncologic Medications    Antimicrobial/Immunologic Medications    Endocrine/Metabolic Medications    Topical/Other Medications    --------------------------------------------------------------------------------------    VITAL SIGNS, INS/OUTS (last 24 hours):  --------------------------------------------------------------------------------------    07-07-25 @ 07:01  -  07-08-25 @ 01:37  --------------------------------------------------------  IN: 988 mL / OUT: 400 mL / NET: 588 mL      --------------------------------------------------------------------------------------    EXAM    NEURO: NAD, alert, oriented, no gross focal deficits  HEENT: NC/AT  RESPIRATORY: nonlabored respirations, normal CW expansion  CARDIO: regular rate  ABDOMEN: soft, nontender, nondistended  EXTREMITIES: normal strength, no deformities    METABOLIC/FLUIDS/ELECTROLYTES  lactated ringers. 1000 milliLiter(s) IV Continuous <Continuous>      HEMATOLOGIC  [x] VTE Prophylaxis:       LABS  --------------------------------------------------------------------------------------    T(C): 36.9 (07-08-25 @ 00:00), Max: 37.1 (07-07-25 @ 18:10)  HR: 62 (07-08-25 @ 00:00) (45 - 103)  BP: 88/67 (07-08-25 @ 00:00) (81/56 - 107/64)  RR: 14 (07-08-25 @ 00:00) (14 - 26)  SpO2: 100% (07-08-25 @ 00:00) (96% - 100%)    --------------------------------------------------------------------------------------   SICU Daily Progress Note  =====================================================  INTERVAL EVENTS    - ativan 1mg for agitation to attempt MRI brain  - shunt tapped by NSGY, 30cc removed  - precedex gtt and ativan .5mg for unsuccessful 2nd attempt MRI brain ON, coy started  - shunt tapped by NSGY, 40cc removed prior to CT  - ativan .5mg for CTH/CTA ON  - emergent ETV procedure in the am (7/8)         MEDICATIONS:   --------------------------------------------------------------------------------------  Neurologic Medications  acetaminophen     Tablet .. 975 milliGRAM(s) Oral every 8 hours PRN Temp greater or equal to 38C (100.4F), Mild Pain (1 - 3)  LORazepam   Injectable 0.5 milliGRAM(s) IV Push once  LORazepam   Injectable 0.5 milliGRAM(s) IV Push once  ondansetron Injectable 4 milliGRAM(s) IV Push every 8 hours PRN Nausea and/or Vomiting    Respiratory Medications    Cardiovascular Medications  phenylephrine    Infusion 0.1 MICROgram(s)/kG/Min IV Continuous <Continuous>    Gastrointestinal Medications  lactated ringers. 1000 milliLiter(s) IV Continuous <Continuous>    Genitourinary Medications    Hematologic/Oncologic Medications    Antimicrobial/Immunologic Medications    Endocrine/Metabolic Medications    Topical/Other Medications    --------------------------------------------------------------------------------------    VITAL SIGNS, INS/OUTS (last 24 hours):  --------------------------------------------------------------------------------------    07-07-25 @ 07:01  -  07-08-25 @ 01:37  --------------------------------------------------------  IN: 988 mL / OUT: 400 mL / NET: 588 mL      --------------------------------------------------------------------------------------    EXAM    NEURO: NAD, alert, oriented, no gross focal deficits  HEENT: NC/AT  RESPIRATORY: nonlabored respirations, normal CW expansion  CARDIO: regular rate  ABDOMEN: soft, nontender, nondistended  EXTREMITIES: normal strength, no deformities    METABOLIC/FLUIDS/ELECTROLYTES  lactated ringers. 1000 milliLiter(s) IV Continuous <Continuous>      HEMATOLOGIC  [x] VTE Prophylaxis:       LABS  07-08    137  |  104  |  9   ----------------------------<  93  3.6   |  21[L]  |  0.59    Ca    8.7      08 Jul 2025 00:45    TPro  6.5  /  Alb  4.2  /  TBili  0.6  /  DBili  x   /  AST  12  /  ALT  13  /  AlkPhos  35[L]  07-08                        12.7   9.12  )-----------( 425      ( 08 Jul 2025 00:45 )             36.5     --------------------------------------------------------------------------------------  PT/INR - ( 08 Jul 2025 00:45 )   PT: 12.4 sec;   INR: 1.04 ratio         PTT - ( 07 Jul 2025 13:00 )  PTT:28.0 sec  T(C): 36.9 (07-08-25 @ 00:00), Max: 37.1 (07-07-25 @ 18:10)  HR: 62 (07-08-25 @ 00:00) (45 - 103)  BP: 88/67 (07-08-25 @ 00:00) (81/56 - 107/64)  RR: 14 (07-08-25 @ 00:00) (14 - 26)  SpO2: 100% (07-08-25 @ 00:00) (96% - 100%)    --------------------------------------------------------------------------------------   SICU Daily Progress Note  =====================================================  INTERVAL EVENTS    - ativan 1mg for agitation to attempt MRI brain  - shunt tapped by NSGY, 30cc removed  - precedex gtt and ativan .5mg for unsuccessful 2nd attempt MRI brain ON, coy started  - shunt tapped by NSGY, 40cc removed prior to CT  - ativan .5mg for CTH/CTA ON  - emergent ETV procedure in the am (7/8)         MEDICATIONS:   --------------------------------------------------------------------------------------  Neurologic Medications  acetaminophen     Tablet .. 975 milliGRAM(s) Oral every 8 hours PRN Temp greater or equal to 38C (100.4F), Mild Pain (1 - 3)  LORazepam   Injectable 0.5 milliGRAM(s) IV Push once  LORazepam   Injectable 0.5 milliGRAM(s) IV Push once  ondansetron Injectable 4 milliGRAM(s) IV Push every 8 hours PRN Nausea and/or Vomiting    Respiratory Medications    Cardiovascular Medications  phenylephrine    Infusion 0.1 MICROgram(s)/kG/Min IV Continuous <Continuous>    Gastrointestinal Medications  lactated ringers. 1000 milliLiter(s) IV Continuous <Continuous>    Genitourinary Medications    Hematologic/Oncologic Medications    Antimicrobial/Immunologic Medications    Endocrine/Metabolic Medications    Topical/Other Medications    --------------------------------------------------------------------------------------    VITAL SIGNS, INS/OUTS (last 24 hours):  --------------------------------------------------------------------------------------    07-07-25 @ 07:01  -  07-08-25 @ 01:37  --------------------------------------------------------  IN: 988 mL / OUT: 400 mL / NET: 588 mL      --------------------------------------------------------------------------------------    EXAM    NEURO: A&Ox3, NAD, oriented, no gross focal deficits, incision dressing c/d/i  HEENT: NC/AT  RESPIRATORY: nonlabored respirations, clear bilaterally to ascultation normal CW expansion  CARDIO: regular rate  ABDOMEN: soft, nontender, nondistended  EXTREMITIES: normal strength, no deformities      METABOLIC/FLUIDS/ELECTROLYTES  lactated ringers. 1000 milliLiter(s) IV Continuous <Continuous>      HEMATOLOGIC  [x] VTE Prophylaxis:       LABS  07-08    137  |  104  |  9   ----------------------------<  93  3.6   |  21[L]  |  0.59    Ca    8.7      08 Jul 2025 00:45    TPro  6.5  /  Alb  4.2  /  TBili  0.6  /  DBili  x   /  AST  12  /  ALT  13  /  AlkPhos  35[L]  07-08                        12.7   9.12  )-----------( 425      ( 08 Jul 2025 00:45 )             36.5     --------------------------------------------------------------------------------------  PT/INR - ( 08 Jul 2025 00:45 )   PT: 12.4 sec;   INR: 1.04 ratio         PTT - ( 07 Jul 2025 13:00 )  PTT:28.0 sec  T(C): 36.9 (07-08-25 @ 00:00), Max: 37.1 (07-07-25 @ 18:10)  HR: 62 (07-08-25 @ 00:00) (45 - 103)  BP: 88/67 (07-08-25 @ 00:00) (81/56 - 107/64)  RR: 14 (07-08-25 @ 00:00) (14 - 26)  SpO2: 100% (07-08-25 @ 00:00) (96% - 100%)    --------------------------------------------------------------------------------------   SICU Daily Progress Note  =====================================================  INTERVAL EVENTS    - ativan 1mg for agitation to attempt MRI brain  - shunt tapped by NSGY, 30cc removed  - precedex gtt and ativan .5mg for unsuccessful 2nd attempt MRI brain ON, coy started  - shunt tapped by NSGY, 40cc removed prior to CT  - ativan .5mg for CTH/CTA ON  - emergent ETV procedure in the am (7/8)         MEDICATIONS:   --------------------------------------------------------------------------------------  Neurologic Medications  acetaminophen     Tablet .. 975 milliGRAM(s) Oral every 8 hours PRN Temp greater or equal to 38C (100.4F), Mild Pain (1 - 3)  LORazepam   Injectable 0.5 milliGRAM(s) IV Push once  LORazepam   Injectable 0.5 milliGRAM(s) IV Push once  ondansetron Injectable 4 milliGRAM(s) IV Push every 8 hours PRN Nausea and/or Vomiting    Respiratory Medications    Cardiovascular Medications  phenylephrine    Infusion 0.1 MICROgram(s)/kG/Min IV Continuous <Continuous>    Gastrointestinal Medications  lactated ringers. 1000 milliLiter(s) IV Continuous <Continuous>    Genitourinary Medications    Hematologic/Oncologic Medications    Antimicrobial/Immunologic Medications    Endocrine/Metabolic Medications    Topical/Other Medications    --------------------------------------------------------------------------------------    VITAL SIGNS, INS/OUTS (last 24 hours):  --------------------------------------------------------------------------------------    07-07-25 @ 07:01  -  07-08-25 @ 01:37  --------------------------------------------------------  IN: 988 mL / OUT: 400 mL / NET: 588 mL      --------------------------------------------------------------------------------------    EXAM    NEURO: A&Ox3, NAD, oriented, no gross focal deficits, incision dressing c/d/i  HEENT: NC/AT  RESPIRATORY: nonlabored respirations  CARDIO: regular rate  ABDOMEN: soft, nontender, nondistended  EXTREMITIES: normal strength, no deformities      METABOLIC/FLUIDS/ELECTROLYTES  lactated ringers. 1000 milliLiter(s) IV Continuous <Continuous>      HEMATOLOGIC  [x] VTE Prophylaxis:       LABS  07-08    137  |  104  |  9   ----------------------------<  93  3.6   |  21[L]  |  0.59    Ca    8.7      08 Jul 2025 00:45    TPro  6.5  /  Alb  4.2  /  TBili  0.6  /  DBili  x   /  AST  12  /  ALT  13  /  AlkPhos  35[L]  07-08                        12.7   9.12  )-----------( 425      ( 08 Jul 2025 00:45 )             36.5     --------------------------------------------------------------------------------------  PT/INR - ( 08 Jul 2025 00:45 )   PT: 12.4 sec;   INR: 1.04 ratio         PTT - ( 07 Jul 2025 13:00 )  PTT:28.0 sec  T(C): 36.9 (07-08-25 @ 00:00), Max: 37.1 (07-07-25 @ 18:10)  HR: 62 (07-08-25 @ 00:00) (45 - 103)  BP: 88/67 (07-08-25 @ 00:00) (81/56 - 107/64)  RR: 14 (07-08-25 @ 00:00) (14 - 26)  SpO2: 100% (07-08-25 @ 00:00) (96% - 100%)    --------------------------------------------------------------------------------------   SICU Daily Progress Note  =====================================================  INTERVAL EVENTS  - emergent ETV procedure in the am (7/8)   - hydrocephalus on stereotactic CT Head, s/p ETV  - decadron taper   - pending post-op MRI, will receive .5 xanax (no further sedation per anesthesia)        MEDICATIONS:   --------------------------------------------------------------------------------------  Neurologic Medications  acetaminophen     Tablet .. 975 milliGRAM(s) Oral every 8 hours PRN Temp greater or equal to 38C (100.4F), Mild Pain (1 - 3)  LORazepam   Injectable 0.5 milliGRAM(s) IV Push once  LORazepam   Injectable 0.5 milliGRAM(s) IV Push once  ondansetron Injectable 4 milliGRAM(s) IV Push every 8 hours PRN Nausea and/or Vomiting    Respiratory Medications    Cardiovascular Medications  phenylephrine    Infusion 0.1 MICROgram(s)/kG/Min IV Continuous <Continuous>    Gastrointestinal Medications  lactated ringers. 1000 milliLiter(s) IV Continuous <Continuous>    Genitourinary Medications    Hematologic/Oncologic Medications    Antimicrobial/Immunologic Medications    Endocrine/Metabolic Medications    Topical/Other Medications    --------------------------------------------------------------------------------------    VITAL SIGNS, INS/OUTS (last 24 hours):  --------------------------------------------------------------------------------------    07-07-25 @ 07:01  -  07-08-25 @ 01:37  --------------------------------------------------------  IN: 988 mL / OUT: 400 mL / NET: 588 mL      --------------------------------------------------------------------------------------    EXAM    NEURO: A&Ox3, NAD, oriented, no gross focal deficits, incision dressing c/d/i  HEENT: NC/AT  RESPIRATORY: nonlabored respirations  CARDIO: regular rate  ABDOMEN: soft, nontender, nondistended  EXTREMITIES: normal strength, no deformities      METABOLIC/FLUIDS/ELECTROLYTES  lactated ringers. 1000 milliLiter(s) IV Continuous <Continuous>      HEMATOLOGIC  [x] VTE Prophylaxis:       LABS  07-08    137  |  104  |  9   ----------------------------<  93  3.6   |  21[L]  |  0.59    Ca    8.7      08 Jul 2025 00:45    TPro  6.5  /  Alb  4.2  /  TBili  0.6  /  DBili  x   /  AST  12  /  ALT  13  /  AlkPhos  35[L]  07-08                        12.7   9.12  )-----------( 425      ( 08 Jul 2025 00:45 )             36.5     --------------------------------------------------------------------------------------  PT/INR - ( 08 Jul 2025 00:45 )   PT: 12.4 sec;   INR: 1.04 ratio         PTT - ( 07 Jul 2025 13:00 )  PTT:28.0 sec  T(C): 36.9 (07-08-25 @ 00:00), Max: 37.1 (07-07-25 @ 18:10)  HR: 62 (07-08-25 @ 00:00) (45 - 103)  BP: 88/67 (07-08-25 @ 00:00) (81/56 - 107/64)  RR: 14 (07-08-25 @ 00:00) (14 - 26)  SpO2: 100% (07-08-25 @ 00:00) (96% - 100%)    --------------------------------------------------------------------------------------

## 2025-07-08 NOTE — DIETITIAN INITIAL EVALUATION ADULT - PROBLEM SELECTOR PLAN 1
- q1h neuro checks  - SICU admit  - keep NPO w/ IV fluids for maintenance  - MRI brain w/wo flow/CISS   - preop labs (cbc, bmp, coags, type and screen x 2)  - preop for ETV vs. shunt revision    Case discussed w Dr. Sewell  [id number]

## 2025-07-08 NOTE — DIETITIAN INITIAL EVALUATION ADULT - PERTINENT MEDS FT
MEDICATIONS  (STANDING):  ceFAZolin   IVPB 2000 milliGRAM(s) IV Intermittent every 8 hours  dexAMETHasone     Tablet 4 milliGRAM(s) Oral every 6 hours  dexAMETHasone     Tablet   Oral   phenylephrine    Infusion 0.1 MICROgram(s)/kG/Min (0.98 mL/Hr) IV Continuous <Continuous>  sodium chloride 0.9%. 1000 milliLiter(s) (75 mL/Hr) IV Continuous <Continuous>    MEDICATIONS  (PRN):  acetaminophen     Tablet .. 975 milliGRAM(s) Oral every 8 hours PRN Temp greater or equal to 38C (100.4F), Mild Pain (1 - 3)  ondansetron Injectable 4 milliGRAM(s) IV Push every 8 hours PRN Nausea and/or Vomiting  oxyCODONE    IR 5 milliGRAM(s) Oral every 4 hours PRN Moderate Pain (4 - 6)  oxyCODONE    IR 10 milliGRAM(s) Oral every 4 hours PRN Severe Pain (7 - 10)

## 2025-07-09 ENCOUNTER — TRANSCRIPTION ENCOUNTER (OUTPATIENT)
Age: 35
End: 2025-07-09

## 2025-07-09 VITALS
OXYGEN SATURATION: 100 % | RESPIRATION RATE: 13 BRPM | DIASTOLIC BLOOD PRESSURE: 61 MMHG | HEART RATE: 71 BPM | SYSTOLIC BLOOD PRESSURE: 96 MMHG

## 2025-07-09 LAB
ANION GAP SERPL CALC-SCNC: 13 MMOL/L — SIGNIFICANT CHANGE UP (ref 7–14)
BASOPHILS # BLD AUTO: 0.02 K/UL — SIGNIFICANT CHANGE UP (ref 0–0.2)
BASOPHILS NFR BLD AUTO: 0.1 % — SIGNIFICANT CHANGE UP (ref 0–2)
BUN SERPL-MCNC: 9 MG/DL — SIGNIFICANT CHANGE UP (ref 7–23)
CALCIUM SERPL-MCNC: 8.7 MG/DL — SIGNIFICANT CHANGE UP (ref 8.4–10.5)
CHLORIDE SERPL-SCNC: 103 MMOL/L — SIGNIFICANT CHANGE UP (ref 98–107)
CO2 SERPL-SCNC: 22 MMOL/L — SIGNIFICANT CHANGE UP (ref 22–31)
CREAT SERPL-MCNC: 0.57 MG/DL — SIGNIFICANT CHANGE UP (ref 0.5–1.3)
EGFR: 122 ML/MIN/1.73M2 — SIGNIFICANT CHANGE UP
EGFR: 122 ML/MIN/1.73M2 — SIGNIFICANT CHANGE UP
EOSINOPHIL # BLD AUTO: 0 K/UL — SIGNIFICANT CHANGE UP (ref 0–0.5)
EOSINOPHIL NFR BLD AUTO: 0 % — SIGNIFICANT CHANGE UP (ref 0–6)
GLUCOSE SERPL-MCNC: 152 MG/DL — HIGH (ref 70–99)
HCT VFR BLD CALC: 33 % — LOW (ref 34.5–45)
HGB BLD-MCNC: 11.6 G/DL — SIGNIFICANT CHANGE UP (ref 11.5–15.5)
IMM GRANULOCYTES # BLD AUTO: 0.11 K/UL — HIGH (ref 0–0.07)
IMM GRANULOCYTES NFR BLD AUTO: 0.6 % — SIGNIFICANT CHANGE UP (ref 0–0.9)
LYMPHOCYTES # BLD AUTO: 0.87 K/UL — LOW (ref 1–3.3)
LYMPHOCYTES NFR BLD AUTO: 4.5 % — LOW (ref 13–44)
MAGNESIUM SERPL-MCNC: 1.9 MG/DL — SIGNIFICANT CHANGE UP (ref 1.6–2.6)
MCHC RBC-ENTMCNC: 33.4 PG — SIGNIFICANT CHANGE UP (ref 27–34)
MCHC RBC-ENTMCNC: 35.2 G/DL — SIGNIFICANT CHANGE UP (ref 32–36)
MCV RBC AUTO: 95.1 FL — SIGNIFICANT CHANGE UP (ref 80–100)
MONOCYTES # BLD AUTO: 1.28 K/UL — HIGH (ref 0–0.9)
MONOCYTES NFR BLD AUTO: 6.6 % — SIGNIFICANT CHANGE UP (ref 2–14)
NEUTROPHILS # BLD AUTO: 17.17 K/UL — HIGH (ref 1.8–7.4)
NEUTROPHILS NFR BLD AUTO: 88.2 % — HIGH (ref 43–77)
NRBC # BLD AUTO: 0 K/UL — SIGNIFICANT CHANGE UP (ref 0–0)
NRBC # FLD: 0 K/UL — SIGNIFICANT CHANGE UP (ref 0–0)
NRBC BLD AUTO-RTO: 0 /100 WBCS — SIGNIFICANT CHANGE UP (ref 0–0)
PHOSPHATE SERPL-MCNC: 2.8 MG/DL — SIGNIFICANT CHANGE UP (ref 2.5–4.5)
PLATELET # BLD AUTO: 390 K/UL — SIGNIFICANT CHANGE UP (ref 150–400)
PMV BLD: 8.6 FL — SIGNIFICANT CHANGE UP (ref 7–13)
POTASSIUM SERPL-MCNC: 3.8 MMOL/L — SIGNIFICANT CHANGE UP (ref 3.5–5.3)
POTASSIUM SERPL-SCNC: 3.8 MMOL/L — SIGNIFICANT CHANGE UP (ref 3.5–5.3)
RBC # BLD: 3.47 M/UL — LOW (ref 3.8–5.2)
RBC # FLD: 12 % — SIGNIFICANT CHANGE UP (ref 10.3–14.5)
SODIUM SERPL-SCNC: 138 MMOL/L — SIGNIFICANT CHANGE UP (ref 135–145)
WBC # BLD: 19.45 K/UL — HIGH (ref 3.8–10.5)
WBC # FLD AUTO: 19.45 K/UL — HIGH (ref 3.8–10.5)

## 2025-07-09 RX ORDER — ACETAMINOPHEN 500 MG/5ML
3 LIQUID (ML) ORAL
Qty: 0 | Refills: 0 | DISCHARGE
Start: 2025-07-09

## 2025-07-09 RX ORDER — DEXAMETHASONE 0.5 MG/1
2 TABLET ORAL
Qty: 25 | Refills: 0
Start: 2025-07-09 | End: 2025-07-10

## 2025-07-09 RX ADMIN — DEXAMETHASONE 4 MILLIGRAM(S): 0.5 TABLET ORAL at 12:50

## 2025-07-09 RX ADMIN — DEXAMETHASONE 4 MILLIGRAM(S): 0.5 TABLET ORAL at 05:36

## 2025-07-09 NOTE — PROGRESS NOTE ADULT - SUBJECTIVE AND OBJECTIVE BOX
_________________________________________________  _______________SICU PROGRESS NOTE________________  _________________________________________________    INTERVAL EVENTS:  - hydrocephalus on stereotactic CT Head, s/p ETV  - decadron taper   - post-op MRI, got .5 xanax (no further sedation per anesthesia)  - passed TOV   - advanced to reg diet     OBJECTIVE:    VITALS:  Vital Signs Last 24 Hrs  T(C): 37.6 (09 Jul 2025 00:00), Max: 37.6 (09 Jul 2025 00:00)  T(F): 99.6 (09 Jul 2025 00:00), Max: 99.6 (09 Jul 2025 00:00)  HR: 66 (09 Jul 2025 00:00) (53 - 93)  BP: 101/53 (09 Jul 2025 00:00) (71/46 - 118/72)  BP(mean): 67 (09 Jul 2025 00:00) (54 - 86)  RR: 16 (09 Jul 2025 00:00) (12 - 26)  SpO2: 98% (09 Jul 2025 00:00) (97% - 100%)    Parameters below as of 08 Jul 2025 16:00  Patient On (Oxygen Delivery Method): room air        I&O's Summary    07 Jul 2025 07:01  -  08 Jul 2025 07:00  --------------------------------------------------------  IN: 2079 mL / OUT: 1275 mL / NET: 804 mL    08 Jul 2025 07:01  -  09 Jul 2025 01:47  --------------------------------------------------------  IN: 885.7 mL / OUT: 1710 mL / NET: -824.3 mL        PHYSICAL EXAM:    NEURO: A&Ox3, NAD, oriented, no gross focal deficits, incision dressing c/d/i    RESPIRATORY  Exam:  nonlabored respirations    CARDIOVASCULAR  Exam:  regular rate  Cardiac Rhythm:  sinus    GI/NUTRITION  Exam:  soft nontender     LABS:                        12.7   9.12  )-----------( 425      ( 08 Jul 2025 00:45 )             36.5   07-08    137  |  104  |  9   ----------------------------<  93  3.6   |  21[L]  |  0.59    Ca    8.7      08 Jul 2025 00:45    TPro  6.5  /  Alb  4.2  /  TBili  0.6  /  DBili  x   /  AST  12  /  ALT  13  /  AlkPhos  35[L]  07-08    CAPILLARY BLOOD GLUCOSE    MEDICATIONS:   MEDICATIONS  (STANDING):  dexAMETHasone     Tablet 4 milliGRAM(s) Oral every 6 hours  dexAMETHasone     Tablet   Oral   phenylephrine    Infusion 0.1 MICROgram(s)/kG/Min (0.98 mL/Hr) IV Continuous <Continuous>  sodium chloride 0.9%. 1000 milliLiter(s) (75 mL/Hr) IV Continuous <Continuous>    MEDICATIONS  (PRN):  acetaminophen     Tablet .. 975 milliGRAM(s) Oral every 8 hours PRN Temp greater or equal to 38C (100.4F), Mild Pain (1 - 3)  melatonin 6 milliGRAM(s) Oral at bedtime PRN Sleep  ondansetron Injectable 4 milliGRAM(s) IV Push every 8 hours PRN Nausea and/or Vomiting  oxyCODONE    IR 5 milliGRAM(s) Oral every 4 hours PRN Moderate Pain (4 - 6)  oxyCODONE    IR 10 milliGRAM(s) Oral every 4 hours PRN Severe Pain (7 - 10)

## 2025-07-09 NOTE — PROGRESS NOTE ADULT - ASSESSMENT
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 Patient was having nausea, vomiting, and headaches that started yesterday, she went to [facility] ED where CTH last night showed hydrocephalus with no comparison imaging and patient was feeling better and neurologically intact. Pt was seen at outpatient neurosurgery office today with Dr. Garcia and was lethargic on exam. She had a shunt tap performed in office with 40cc CSF removed and she was sent to [facility] ER. Shunt series shows that the shunt catheter is broken in the neck with the distal tubing coiled in the abdomen.     7/7 Admitted for MRI and ETV vs Shunt revision   7/8 OR for endoscopic third ventriculostomy   7/9 s/p ETV, postop mri brain w/ csf flow done f/u read

## 2025-07-09 NOTE — PROGRESS NOTE ADULT - SUBJECTIVE AND OBJECTIVE BOX
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 Patient was having nausea, vomiting, and headaches that started yesterday, she went to [facility] ED where CTH last night showed hydrocephalus with no comparison imaging and patient was feeling better and neurologically intact. Pt was seen at outpatient neurosurgery office today with Dr. Garcia and was lethargic on exam. She had a shunt tap performed in office with 40cc CSF removed and she was sent to [facility] ER. Patient is now alert and oriented x 3 with some deficits in memory (i.e cannot remember being seen in outpatient neurosurgery office today) but is otherwise intact. Shunt series shows that the shunt catheter is broken in the neck with the distal tubing coiled in the abdomen.      (07 Jul 2025 13:06)      Dx: 35y Female s/p ETV  from Left sergey hole for hydrocephalus, mri w/ csf flow postop w/ sed done.     MEDICATIONS  (STANDING):  ceFAZolin   IVPB 2000 milliGRAM(s) IV Intermittent every 8 hours  dexAMETHasone     Tablet 4 milliGRAM(s) Oral every 6 hours  dexAMETHasone     Tablet   Oral   phenylephrine    Infusion 0.1 MICROgram(s)/kG/Min (0.98 mL/Hr) IV Continuous <Continuous>  sodium chloride 0.9%. 1000 milliLiter(s) (75 mL/Hr) IV Continuous <Continuous>    MEDICATIONS  (PRN):  acetaminophen     Tablet .. 975 milliGRAM(s) Oral every 8 hours PRN Temp greater or equal to 38C (100.4F), Mild Pain (1 - 3)  ondansetron Injectable 4 milliGRAM(s) IV Push every 8 hours PRN Nausea and/or Vomiting  oxyCODONE    IR 5 milliGRAM(s) Oral every 4 hours PRN Moderate Pain (4 - 6)  oxyCODONE    IR 10 milliGRAM(s) Oral every 4 hours PRN Severe Pain (7 - 10)                                           11.6   19.45 )-----------( 390      ( 09 Jul 2025 01:35 )             33.0     07-09    138  |  103  |  9   ----------------------------<  152[H]  3.8   |  22  |  0.57    Ca    8.7      09 Jul 2025 01:35  Phos  2.8     07-09  Mg     1.90     07-09    TPro  6.5  /  Alb  4.2  /  TBili  0.6  /  DBili  x   /  AST  12  /  ALT  13  /  AlkPhos  35[L]  07-08    HR: 64 (07-08-25 @ 12:00) (64 - 89)  BP: 113/64 (07-08-25 @ 12:00) (88/50 - 117/67)  RR: 21 (07-08-25 @ 12:00) (12 - 26)  SpO2: 100% (07-08-25 @ 12:00) (99% - 100%)    PHYSICAL EXAM:  AOx3, appropriate, follows commands  PERRL, EOMI, face symmetrical   KEENAN x 4 with good strength   Sensation intact to light touch   No pronator drift   Incision C/D/I       MPRESSION:        1.      Intracranial circulation:  No significant vascular lesion.        2.   Brain:  The ventriclesare enlarged to a moderate degree with   minimal transependymal flow consistent with communicating hydrocephalus.   Posterior RIGHT parietal shunt catheter is noted with tip in anterior   body of RIGHT lateral ventricle.

## 2025-07-09 NOTE — DISCHARGE NOTE PROVIDER - NSDCMRMEDTOKEN_GEN_ALL_CORE_FT
acetaminophen 325 mg oral tablet: 3 tab(s) orally every 8 hours As needed Temp greater or equal to 38C (100.4F), Mild Pain (1 - 3)  dexAMETHasone 2 mg oral tablet: 2 tab(s) orally every 8 hours TAPER AS FOLLOWS:  4mg every 8 hours for 2days   3mg every 8 hours for 2 days  2mg every 12 hours for 1 day  1mg every 12 hours for 1 day  1mg once per day for 1 day, then discontinue.

## 2025-07-09 NOTE — PROGRESS NOTE ADULT - SUBJECTIVE AND OBJECTIVE BOX
POST ANESTHESIA EVALUATION    35y Female POSTOP DAY 1   MENTAL STATUS: Patient participation [ x ] Awake     [  ] Arousable     [  ] Sedated    AIRWAY PATENCY: [ x ] Satisfactory  [  ] Other:     Vital Signs Last 24 Hrs  T(C): 36.4 (09 Jul 2025 08:00), Max: 37.6 (09 Jul 2025 00:00)  T(F): 97.6 (09 Jul 2025 08:00), Max: 99.6 (09 Jul 2025 00:00)  HR: 83 (09 Jul 2025 10:00) (52 - 93)  BP: 87/59 (09 Jul 2025 10:00) (85/43 - 118/72)  BP(mean): 69 (09 Jul 2025 10:00) (56 - 86)  RR: 15 (09 Jul 2025 10:00) (13 - 26)  SpO2: 100% (09 Jul 2025 10:00) (97% - 100%)    Parameters below as of 09 Jul 2025 04:00  Patient On (Oxygen Delivery Method): room air      I&O's Summary    08 Jul 2025 07:01  -  09 Jul 2025 07:00  --------------------------------------------------------  IN: 964.7 mL / OUT: 2260 mL / NET: -1295.3 mL    09 Jul 2025 07:01  -  09 Jul 2025 10:53  --------------------------------------------------------  IN: 114 mL / OUT: 300 mL / NET: -186 mL          HYDRATION STATUS:  [ x ] SATISFACTORY   [  ] OTHER    NAUSEA/ VOMITTING:  [ x ] NONE  [  ] CONTROLLED [  ] OTHER     PAIN: [ x ] CONTROLLED WITH CURRENT REGIMEN  [  ] OTHER    [ x ] NO APPARENT ANESTHESIA COMPLICATIONS      Comments:  likely DC directly from ICU to home

## 2025-07-09 NOTE — DISCHARGE NOTE NURSING/CASE MANAGEMENT/SOCIAL WORK - NSDCPEFALRISK_GEN_ALL_CORE
For information on Fall & Injury Prevention, visit: [email or web address] OR  [email or web address] OR  https://www.cdc.gov/steadi/patient.html

## 2025-07-09 NOTE — PROGRESS NOTE ADULT - PROBLEM SELECTOR PLAN 1
- [id number]  - Unable to tolerate MR, obtain Stereo CTH/CTA STAT   - NPO with IVF  - Preop for ETV vs. shunt revision    Case discussed w Dr. Sewell  [id number].
- MRI brain w/ CISS/CINE (w/ sedation)  - keep NPO with IVF for maintenance  - cont. w/ q1h neuro checks  - dex taper 4mg q6h to off over 1 week  - cont. Anced x 24 hours post op      case d/w attending   [id number]
- f/u read MRI brain w/ CISS/CINE   - cont. w/ q1h neuro checks  - dex taper 4mg q6h to off over 1 week  - cont. Ancef x 24 hours post op      case d/w attending   [id number]

## 2025-07-09 NOTE — PROGRESS NOTE ADULT - ATTENDING COMMENTS
I saw and examined [facility] with her father at the bedside. She is doing extremely well s/p ETV. She has no headache, no nausea or vomitting. She is neurologically intact. MRI shows a patent ventriculostomy. I discussed that she should eat, ambulate, and once the MRI is formally read she may be discharged. She should continue her decadron taper. I will see her in the office in 1 week.

## 2025-07-09 NOTE — DISCHARGE NOTE NURSING/CASE MANAGEMENT/SOCIAL WORK - FINANCIAL ASSISTANCE
[facility] provides services at a reduced cost to those who are determined to be eligible through [facility]’s financial assistance program. Information regarding [facility]’s financial assistance program can be found by going to [email or web address] or by calling [phone].

## 2025-07-09 NOTE — DISCHARGE NOTE PROVIDER - HOSPITAL COURSE
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 Patient was having nausea, vomiting, and headaches that started yesterday, she went to [facility] ED where CTH last night showed hydrocephalus with no comparison imaging and patient was feeling better and neurologically intact. Pt was seen at outpatient neurosurgery office today with Dr. Garcia and was lethargic on exam. She had a shunt tap performed in office with 40cc CSF removed and she was sent to [facility] ER. Shunt series shows that the shunt catheter is broken in the neck with the distal tubing coiled in the abdomen.     7/7 Admitted for MRI and ETV vs Shunt revision, shunt tapped x2 overnight (30cc @ 6:30pm. 40cc@12:30am)  7/8 Urgent OR for endoscopic third ventriculostomy 2/2 severe HA, vomiting, bradycardia   7/9 s/p ETV, postop mri brain w/ csf flow done f/u read

## 2025-07-09 NOTE — DISCHARGE NOTE PROVIDER - CARE PROVIDER_API CALL
Alecia Sewell  Pediatric Neurosurgery  [address] [place] [address], [facility] [address]  [place] [address]  Phone: [phone]  Fax: [phone]  Follow Up Time: 1 week

## 2025-07-09 NOTE — DISCHARGE NOTE PROVIDER - NSDCFUADDINST_GEN_ALL_CORE_FT
- You had surgery on 7/8/2025. The surgery you had was endoscopic third ventriculostomy.    - Remove bandage from incision site on post op day 3 if it was not removed by the surgical team prior to discharge. Once removed, incision site does not need a bandage or ointment on it. If you have steri strips (small, skinny beige strips), they will eventually fall off over time. Do not pull at steri strips. If steri strips are more than residential off, you may remove them. Do not touch or scratch incision to prevent infection.    - Your incision is closed with clear sutures, these are absorbable and will dissolve over time.     - Shower daily with shampoo/soap on post operative day 4 (DATE: 7/12/25) Avoid long soaks and do not submerge incision in water (no baths.) Allow soap and water to run over the incision. Pat incision area dry with clean towel- do not scrub. Please shower regularly to ensure incision stays clean to avoid post operative infections.  You may have a body shower daily, as long as your head incision is covered by a shower cap and does not get wet until post op day 4.     - Notify your surgeon if you notice increased redness, drainage or your incision area opening.     - Return to ER immediately for high fevers, severe headache, vomiting, lethargy or weakness    - Please call your neurosurgeon following discharge to make follow up appointment in 1 week after discharge unless otherwise specified. See contact information.    - Prescription post operative medication has been sent to [facility] in the hospital. All post operative prescriptions should be picked up before departing the hospital. You can also take over the counter tylenol for pain as needed.     - Ambulate as tolerate. Continue with all "activities of daily living." Avoid strenuous activity or heavy lifting until cleared for additional activity at your follow up appointment. You cannot drive while taking narcotics (oxycodone, valium, etc.)     - Do not return to work or school until cleared by your neurosurgeon at your follow up visit unless specified to you during your hospital stay    - Do not take any blood thinning medications such as aspirin, motrin, ibuprofen, warfarin, coumadin, plavix, heparin, lovenox, Xarelto, Eliquis etc. until cleared by your neurosurgeon    - Surgery, anesthesia, and pain medications can cause constipation. Please take over the counter stool softeners daily until regular bowel movements are achieved. Examples include Miralax, Senna, Colace, Milk of Magnesia, or Dulcolax suppositories. Please consult drug store pharmacist or pediatrician if there are question about dosing if the patient is pediatric.

## 2025-07-09 NOTE — PROGRESS NOTE ADULT - ASSESSMENT
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 CTH done at [facility] overnight revealed enlargement of the third and lateral ventricles consistent with hydrocephalus and shunt malfunction. Pt. was seen at outpatient neurosurgery office 7/7with Dr. Garcia and was lethargic on exam. She had a shunt tap performed in office with 40cc removed. Patient is now alert and oriented x 3 with some deficits in memory (i.e cannot remember being seen in outpatient neurosurgery office today) but is otherwise intact. Now admitted to SICU for q1 neuro checks. S/p ETV 7/8.     NEUROLOGIC   - Pain control: Tylenol  q8H and oxy  - s/p ETV 7/8  - Q1h neurochecks  - on decadron taper   - pending MRI with anesthesia     RESPIRATORY   - Monitor SpO2 goal >92%    CARDIOVASCULAR   - on and off coy  - Monitor hemodynamics     GASTROINTESTINAL   - Diet: NPO    /RENAL   - IV fluids: LR @ 90 mL/hr  - Monitor electrolytes, replete PRN    HEMATOLOGIC  - Monitor H/H   - SCDs  - holding DVT ppx per NSGY     INFECTIOUS DISEASE  - Monitor fever / WBC  - ancef post op    ENDOCRINE  - Monitor gluc  - decadron taper     LINES  - PIV     DISPO: SICU   35yoF with VPS (Delta valve set at 1.5) 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 CTH done at [facility] overnight revealed enlargement of the third and lateral ventricles consistent with hydrocephalus and shunt malfunction. Pt. was seen at outpatient neurosurgery office 7/7with Dr. Garcia and was lethargic on exam. She had a shunt tap performed in office with 40cc removed. Patient is now alert and oriented x 3 with some deficits in memory (i.e cannot remember being seen in outpatient neurosurgery office today) but is otherwise intact. Now admitted to SICU for q1 neuro checks. S/p ETV 7/8.     NEUROLOGIC   - Pain control: Tylenol  q8H and oxy  - s/p ETV 7/8  - Q1h neurochecks  - on decadron taper   - pending MRI read    RESPIRATORY   - Monitor SpO2 goal >92%    CARDIOVASCULAR   - on coy for pressure support  - Monitor hemodynamics     GASTROINTESTINAL   - Diet: regular diet    /RENAL   - Monitor electrolytes, replete PRN    HEMATOLOGIC  - Monitor H/H   - SCDs  - holding DVT ppx per NSGY     INFECTIOUS DISEASE  - Monitor fever / WBC  - ancef post op x 24h    ENDOCRINE  - Monitor gluc  - decadron taper     LINES  - PIV     DISPO: SICU

## 2025-07-09 NOTE — DISCHARGE NOTE NURSING/CASE MANAGEMENT/SOCIAL WORK - PATIENT PORTAL LINK FT
You can access the FollowMyHealth Patient Portal offered by [facility] by registering at the following website: [email or web address]. By joining [employer]’s FollowMyHealth portal, you will also be able to view your health information using other applications (apps) compatible with our system.

## 2025-07-19 ENCOUNTER — TRANSCRIPTION ENCOUNTER (OUTPATIENT)
Age: 35
End: 2025-07-19

## 2025-07-19 ENCOUNTER — EMERGENCY (EMERGENCY)
Facility: HOSPITAL | Age: 35
LOS: 1 days | End: 2025-07-19
Attending: EMERGENCY MEDICINE | Admitting: EMERGENCY MEDICINE
Payer: COMMERCIAL

## 2025-07-19 VITALS
WEIGHT: 113.98 LBS | TEMPERATURE: 99 F | DIASTOLIC BLOOD PRESSURE: 68 MMHG | SYSTOLIC BLOOD PRESSURE: 106 MMHG | HEIGHT: 64 IN | OXYGEN SATURATION: 98 % | RESPIRATION RATE: 18 BRPM | HEART RATE: 86 BPM

## 2025-07-19 VITALS
TEMPERATURE: 99 F | SYSTOLIC BLOOD PRESSURE: 90 MMHG | HEART RATE: 86 BPM | OXYGEN SATURATION: 98 % | DIASTOLIC BLOOD PRESSURE: 58 MMHG | RESPIRATION RATE: 17 BRPM

## 2025-07-19 DIAGNOSIS — Z98.2 PRESENCE OF CEREBROSPINAL FLUID DRAINAGE DEVICE: Chronic | ICD-10-CM

## 2025-07-19 DIAGNOSIS — G03.0 NONPYOGENIC MENINGITIS: ICD-10-CM

## 2025-07-19 PROBLEM — Q03.9 CONGENITAL HYDROCEPHALUS, UNSPECIFIED: Chronic | Status: ACTIVE | Noted: 2025-07-06

## 2025-07-19 LAB
ALBUMIN SERPL ELPH-MCNC: 3.8 G/DL — SIGNIFICANT CHANGE UP (ref 3.3–5)
ALP SERPL-CCNC: 32 U/L — LOW (ref 40–120)
ALT FLD-CCNC: 19 U/L — SIGNIFICANT CHANGE UP (ref 4–33)
ANION GAP SERPL CALC-SCNC: 10 MMOL/L — SIGNIFICANT CHANGE UP (ref 7–14)
APTT BLD: 27.3 SEC — SIGNIFICANT CHANGE UP (ref 26.1–36.8)
AST SERPL-CCNC: 14 U/L — SIGNIFICANT CHANGE UP (ref 4–32)
BASOPHILS # BLD AUTO: 0.03 K/UL — SIGNIFICANT CHANGE UP (ref 0–0.2)
BASOPHILS NFR BLD AUTO: 0.3 % — SIGNIFICANT CHANGE UP (ref 0–2)
BILIRUB SERPL-MCNC: 0.2 MG/DL — SIGNIFICANT CHANGE UP (ref 0.2–1.2)
BLD GP AB SCN SERPL QL: NEGATIVE — SIGNIFICANT CHANGE UP
BUN SERPL-MCNC: 14 MG/DL — SIGNIFICANT CHANGE UP (ref 7–23)
CALCIUM SERPL-MCNC: 8.3 MG/DL — LOW (ref 8.4–10.5)
CHLORIDE SERPL-SCNC: 104 MMOL/L — SIGNIFICANT CHANGE UP (ref 98–107)
CO2 SERPL-SCNC: 24 MMOL/L — SIGNIFICANT CHANGE UP (ref 22–31)
CREAT SERPL-MCNC: 0.63 MG/DL — SIGNIFICANT CHANGE UP (ref 0.5–1.3)
EGFR: 119 ML/MIN/1.73M2 — SIGNIFICANT CHANGE UP
EGFR: 119 ML/MIN/1.73M2 — SIGNIFICANT CHANGE UP
EOSINOPHIL # BLD AUTO: 0.22 K/UL — SIGNIFICANT CHANGE UP (ref 0–0.5)
EOSINOPHIL NFR BLD AUTO: 2.4 % — SIGNIFICANT CHANGE UP (ref 0–6)
GLUCOSE SERPL-MCNC: 91 MG/DL — SIGNIFICANT CHANGE UP (ref 70–99)
HCT VFR BLD CALC: 33.8 % — LOW (ref 34.5–45)
HGB BLD-MCNC: 11.3 G/DL — LOW (ref 11.5–15.5)
IMM GRANULOCYTES # BLD AUTO: 0.14 K/UL — HIGH (ref 0–0.07)
IMM GRANULOCYTES NFR BLD AUTO: 1.5 % — HIGH (ref 0–0.9)
INR BLD: 0.91 RATIO — SIGNIFICANT CHANGE UP (ref 0.85–1.16)
LYMPHOCYTES # BLD AUTO: 2.96 K/UL — SIGNIFICANT CHANGE UP (ref 1–3.3)
LYMPHOCYTES NFR BLD AUTO: 32.1 % — SIGNIFICANT CHANGE UP (ref 13–44)
MCHC RBC-ENTMCNC: 33 PG — SIGNIFICANT CHANGE UP (ref 27–34)
MCHC RBC-ENTMCNC: 33.4 G/DL — SIGNIFICANT CHANGE UP (ref 32–36)
MCV RBC AUTO: 98.8 FL — SIGNIFICANT CHANGE UP (ref 80–100)
MONOCYTES # BLD AUTO: 0.91 K/UL — HIGH (ref 0–0.9)
MONOCYTES NFR BLD AUTO: 9.9 % — SIGNIFICANT CHANGE UP (ref 2–14)
NEUTROPHILS # BLD AUTO: 4.97 K/UL — SIGNIFICANT CHANGE UP (ref 1.8–7.4)
NEUTROPHILS NFR BLD AUTO: 53.8 % — SIGNIFICANT CHANGE UP (ref 43–77)
NRBC # BLD AUTO: 0 K/UL — SIGNIFICANT CHANGE UP (ref 0–0)
NRBC # FLD: 0 K/UL — SIGNIFICANT CHANGE UP (ref 0–0)
NRBC BLD AUTO-RTO: 0 /100 WBCS — SIGNIFICANT CHANGE UP (ref 0–0)
PLATELET # BLD AUTO: 351 K/UL — SIGNIFICANT CHANGE UP (ref 150–400)
PMV BLD: 8.7 FL — SIGNIFICANT CHANGE UP (ref 7–13)
POTASSIUM SERPL-MCNC: 3.8 MMOL/L — SIGNIFICANT CHANGE UP (ref 3.5–5.3)
POTASSIUM SERPL-SCNC: 3.8 MMOL/L — SIGNIFICANT CHANGE UP (ref 3.5–5.3)
PROT SERPL-MCNC: 6.1 G/DL — SIGNIFICANT CHANGE UP (ref 6–8.3)
PROTHROM AB SERPL-ACNC: 10.8 SEC — SIGNIFICANT CHANGE UP (ref 9.9–13.4)
RBC # BLD: 3.42 M/UL — LOW (ref 3.8–5.2)
RBC # FLD: 12 % — SIGNIFICANT CHANGE UP (ref 10.3–14.5)
RH IG SCN BLD-IMP: NEGATIVE — SIGNIFICANT CHANGE UP
SODIUM SERPL-SCNC: 138 MMOL/L — SIGNIFICANT CHANGE UP (ref 135–145)
WBC # BLD: 9.23 K/UL — SIGNIFICANT CHANGE UP (ref 3.8–10.5)
WBC # FLD AUTO: 9.23 K/UL — SIGNIFICANT CHANGE UP (ref 3.8–10.5)

## 2025-07-19 PROCEDURE — 70250 X-RAY EXAM OF SKULL: CPT | Mod: 26

## 2025-07-19 PROCEDURE — 70450 CT HEAD/BRAIN W/O DYE: CPT | Mod: 26

## 2025-07-19 PROCEDURE — 74018 RADEX ABDOMEN 1 VIEW: CPT | Mod: 26

## 2025-07-19 PROCEDURE — 99053 MED SERV 10PM-8AM 24 HR FAC: CPT

## 2025-07-19 PROCEDURE — 99284 EMERGENCY DEPT VISIT MOD MDM: CPT

## 2025-07-19 PROCEDURE — 71045 X-RAY EXAM CHEST 1 VIEW: CPT | Mod: 26

## 2025-07-19 RX ORDER — ACETAMINOPHEN 500 MG/5ML
1000 LIQUID (ML) ORAL ONCE
Refills: 0 | Status: COMPLETED | OUTPATIENT
Start: 2025-07-19 | End: 2025-07-19

## 2025-07-19 RX ORDER — DEXAMETHASONE 0.5 MG/1
1 TABLET ORAL
Qty: 7 | Refills: 0
Start: 2025-07-19 | End: 2025-07-25

## 2025-07-19 RX ORDER — DEXAMETHASONE 0.5 MG/1
1 TABLET ORAL
Qty: 6 | Refills: 0
Start: 2025-07-19 | End: 2025-07-24

## 2025-07-19 RX ORDER — DEXAMETHASONE 0.5 MG/1
4 TABLET ORAL ONCE
Refills: 0 | Status: COMPLETED | OUTPATIENT
Start: 2025-07-19 | End: 2025-07-19

## 2025-07-19 RX ORDER — METOCLOPRAMIDE HCL 10 MG
10 TABLET ORAL ONCE
Refills: 0 | Status: COMPLETED | OUTPATIENT
Start: 2025-07-19 | End: 2025-07-19

## 2025-07-19 RX ADMIN — DEXAMETHASONE 4 MILLIGRAM(S): 0.5 TABLET ORAL at 12:12

## 2025-07-19 RX ADMIN — Medication 400 MILLIGRAM(S): at 09:22

## 2025-07-19 RX ADMIN — Medication 1000 MILLILITER(S): at 09:22

## 2025-07-19 RX ADMIN — Medication 1000 MILLIGRAM(S): at 10:18

## 2025-07-19 NOTE — ED PROVIDER NOTE - CLINICAL SUMMARY MEDICAL DECISION MAKING FREE TEXT BOX
34 yo placed 25 years ago due to aqueductal stenosis and tectal lesion, Borderline personality disorder, ADHD presents to ED for shunt malfunction July 7th. PE demonstrating no neuro deficits and otherwise nonfocal. Will obtain labs, imaging and reach out to neuro surgery for further managment.

## 2025-07-19 NOTE — ED ADULT TRIAGE NOTE - CHIEF COMPLAINT QUOTE
c/o HA since 2000 last night. endorsing photosensitively. pt had endoscopic third ventriculostomy on 7/7. no neuro deficits noted. Hx hydrocephalus, BPD, depression

## 2025-07-19 NOTE — ED PROVIDER NOTE - ATTENDING CONTRIBUTION TO CARE
Seen and examined, d/w resident. Pt. with frontal pressure like HA, dissimilar from prev. shunt related c/o and hx of hydrocephalus. Pt. with recent ventriculostomy after found to have shunt malfunction. Denies N/V, no change in vision, no neck c/o, no fever/chills, ambulating w nl gait and no c/o weakness/numbness. JANNET, EOMI, neck full ROM, clear lungs, heart reg, abd soft, NT to palp.

## 2025-07-19 NOTE — CONSULT NOTE ADULT - SUBJECTIVE AND OBJECTIVE BOX
NEUROSURGERY CONSULT    HPI: 34 yo placed 25 years ago due to aqueductal stenosis and tectal lesion, Borderline personality disorder, ADHD presents to ED for shunt malfunction July 7th. Shunt found to be malfunctioned with catheter broken. Ventriculostomy done July 8th with no post op complications. Pt coming in for 10/10 sharp headache constant located on left side with pain in the eye. Denies nausea, vomiting, fevers, chills. Photophobia, phonophobia, no neuro complaints. Does not typically feel like her headaches in the past.    Prior Neurosurgical Course:   7/7 Admitted for MRI and ETV vs Shunt revision   7/8 OR for endoscopic third ventriculostomy   7/9 s/p ETV, postop mri brain w/ csf flow done     RADIOLOGY:   7/19/25    PROCEDURE DATE:  07/19/2025          INTERPRETATION:  INDICATION:  Headaches.  TECHNIQUE:  A non contrast thin section axial CT study of the brain was   performed from skull base to vertex. Coronal and sagittal reformations   were generated from the axial data.  COMPARISON EXAMINATION:  CT dated 7/8/2025    FINDINGS:    HEMISPHERES:  A ventriculostomy is noted in the right parietal region   entering the right lateral ventricle. Both hemispheres appear to be   intact, with no acute infarct, hemorrhage, or space-occupying lesion.  VENTRICLES:  Ventricles are smaller in size as compared to the prior CT   suggesting an interval shunt revision and interval improvement. There is   noCT evidence of transependymal migration of CSF.  POSTERIOR FOSSA:  The brain stem and cerebellum are unremarkable.  No CP   angle lesion noted.  EXTRACEREBRAL SPACES:  No subdural or epidural collections are noted.  SKULL BASE AND CALVARIUM:  No acute fracture identified. Guston hole is   seen on the left in the frontal region from an old ventriculostomy. Right   ventriculostomy appears to be continuous.  SINUSES AND MASTOIDS:  Clear.  MISCELLANEOUS:  No orbital or suprasellar abnormality noted.    IMPRESSION:    1)  interval decrease in size of the ventricles as compared to the prior   study with a right-sided ventriculostomy in situ. No acute abnormality   suggested  2)  clear sinuses and mastoids..    --- End of Report ---            NATALYA DHALIWAL MD; Attending Radiologist  This document has been electronically signed. Jul 19 2025  9:44AM        PROCEDURE DATE:  07/19/2025          INTERPRETATION:  INDICATION:  Headaches.  TECHNIQUE:  A non contrast thin section axial CT study of the brain was   performed from skull base to vertex. Coronal and sagittal reformations   were generated from the axial data.  COMPARISON EXAMINATION:  CT dated 7/8/2025    FINDINGS:    HEMISPHERES:  A ventriculostomy is noted in the right parietal region   entering the right lateral ventricle. Both hemispheres appear to be   intact, with no acute infarct, hemorrhage, or space-occupying lesion.  VENTRICLES:  Ventricles are smaller in size as compared to the prior CT   suggesting an interval shunt revision and interval improvement. There is   noCT evidence of transependymal migration of CSF.  POSTERIOR FOSSA:  The brain stem and cerebellum are unremarkable.  No CP   angle lesion noted.  EXTRACEREBRAL SPACES:  No subdural or epidural collections are noted.  SKULL BASE AND CALVARIUM:  No acute fracture identified. Guston hole is   seen on the left in the frontal region from an old ventriculostomy. Right   ventriculostomy appears to be continuous.  SINUSES AND MASTOIDS:  Clear.  MISCELLANEOUS:  No orbital or suprasellar abnormality noted.    IMPRESSION:    1)  interval decrease in size of the ventricles as compared to the prior   study with a right-sided ventriculostomy in situ. No acute abnormality   suggested  2)  clear sinuses and mastoids..    --- End of Report ---            NATALYA DHALIWAL MD; Attending Radiologist  This document has been electronically signed. Jul 19 2025  9:44AM    MEDS:      Vital Signs Last 24 Hrs  T(C): 37.3 (19 Jul 2025 11:58), Max: 37.3 (19 Jul 2025 11:58)  T(F): 99.2 (19 Jul 2025 11:58), Max: 99.2 (19 Jul 2025 11:58)  HR: 86 (19 Jul 2025 11:58) (86 - 86)  BP: 90/58 (19 Jul 2025 11:58) (90/58 - 106/68)  BP(mean): --  RR: 17 (19 Jul 2025 11:58) (17 - 18)  SpO2: 98% (19 Jul 2025 11:58) (98% - 98%)    Parameters below as of 19 Jul 2025 07:30  Patient On (Oxygen Delivery Method): room air        LABS:                        11.3   9.23  )-----------( 351      ( 19 Jul 2025 09:25 )             33.8     07-19    138  |  104  |  14  ----------------------------<  91  3.8   |  24  |  0.63    Ca    8.3[L]      19 Jul 2025 09:25    TPro  6.1  /  Alb  3.8  /  TBili  0.2  /  DBili  x   /  AST  14  /  ALT  19  /  AlkPhos  32[L]  07-19    PT/INR - ( 19 Jul 2025 09:25 )   PT: 10.8 sec;   INR: 0.91 ratio         PTT - ( 19 Jul 2025 09:25 )  PTT:27.3 sec      PHYSICAL EXAM:  AOx3, appropriate, follows commands  PERRL, EOMI, face symmetrical   KEENAN x 4 with good strength   Sensation intact to light touch   No pronator drift   Incision C/D/I

## 2025-07-19 NOTE — ED ADULT NURSE NOTE - NSFALLUNIVINTERV_ED_ALL_ED
Bed/Stretcher in lowest position, wheels locked, appropriate side rails in place/Call bell, personal items and telephone in reach/Instruct patient to call for assistance before getting out of bed/chair/stretcher/Non-slip footwear applied when patient is off stretcher/[place] to call system/Physically safe environment - no spills, clutter or unnecessary equipment/Purposeful proactive rounding/Room/bathroom lighting operational, light cord in reach

## 2025-07-19 NOTE — ED PROVIDER NOTE - OBJECTIVE STATEMENT
34 yo placed 25 years ago due to aqueductal stenosis and tectal lesion, Borderline personality disorder, ADHD presents to ED for shunt malfunction July 7th. Shunt found to be malfunctioned with catheter broken. Ventriculostomy done July 8th with no post op complications. Pt coming in for 10/10 sharp headache constant located on left side with pain in the eye. Photophobia, phonophobia, no neuro complaints. Does not typically feel like her headaches in the past. 36 yo placed 25 years ago due to aqueductal stenosis and tectal lesion, Borderline personality disorder, ADHD presents to ED for shunt malfunction July 7th. Shunt found to be malfunctioned with catheter broken. Ventriculostomy done July 8th with no post op complications. Pt coming in for 10/10 sharp headache constant located on left side with pain in the eye. Denies nausea, vomiting, fevers, chills. Photophobia, phonophobia, no neuro complaints. Does not typically feel like her headaches in the past.

## 2025-07-19 NOTE — ED PROVIDER NOTE - NSFOLLOWUPINSTRUCTIONS_ED_ALL_ED_FT
You were seen in the emergency department for headache secondary to aseptic meningitis.    - Lab and imaging results, if performed, were discussed with you along with your discharge diagnosis.    - Return to the ED for any new, worsening, or concerning symptoms to you.    - Continue all prescribed medications.    - Take ibuprofen/tylenol as directed as needed for pain.     - Rest and keep yourself hydrated with fluids.    We have sent a dexamethasone taper to your pharmacy which you should follow the instructions below.    2mg every 8 hours x 2 days  2mg every 12 hours x 1 day  1mg every 8 hours for x 2 days  1mg every 12 hours x 1 day   1 mg every 24 hours for 1 day (total of 7 days)    Follow up with Dr. Sewell in office within 1 week.

## 2025-07-19 NOTE — ED PROVIDER NOTE - PROGRESS NOTE DETAILS
Patient stating headache resolved with fluid bolus and Tylenol.  Patient refuses Reglan after discussion of the benefits of Reglan for her headache.  Neurosurgery recommendations pending.    Natalia Mcrae MD PGY2

## 2025-07-19 NOTE — CONSULT NOTE ADULT - PROBLEM SELECTOR RECOMMENDATION 9
- headaches are likely attributed to aseptic meningitis. Will give a dose of dexamethasone 4mg now, and discharge home with 2mg every 8 hours with taper to off over 1 week.   - please discharge home with the following dexamethasone taper: 2mg every 8 hours x 2 days, 2mg every 12 hours x 1 day, 1mg every 8 hours for x 2 days, 1mg every 12 hours x 1 day then 1 mg every 24 hours for 1 day (total of 7 days)  - ok to follow up with Dr. Sewell in office within 1 week as outpatient    case d/w attending  [id number]

## 2025-07-19 NOTE — ED ADULT NURSE NOTE - OBJECTIVE STATEMENT
This is a 34 y/o female alert and oriented x 4, with a medical history of hydrocephalus, s/p surgical removal of the fluids. Presented today with headache, light sensitivity. Denies n/v. Denies chest pain, breathing is even and unlabored, chest is clear bilaterally, abdomen is soft and non tender on palpation. Denies urinary symptoms, Normal bowel movement. Moving all limbs no deficits noted. Unable to check pupils at this time due to sensitivity. Waiting for neuro consult. Family at bedside. Bed in lowest position, side rails up for safety.

## 2025-07-19 NOTE — ED PROVIDER NOTE - CARE PROVIDER_API CALL
Alecia Sewell)  Pediatric Neurosurgery  [address] [place] [address], [address]  [place] [address]  Phone: [phone]  Fax: [phone] Patient  Follow Up Time: 7-10 Days

## 2025-07-19 NOTE — ED PROVIDER NOTE - PATIENT PORTAL LINK FT
You can access the FollowMyHealth Patient Portal offered by [facility] by registering at the following website: [email or web address]. By joining [facility]’s FollowMyHealth portal, you will also be able to view your health information using other applications (apps) compatible with our system.

## (undated) DEVICE — SOL IRR POUR NS 0.9% 500ML

## (undated) DEVICE — ELCTR GROUNDING PAD ADULT COVIDIEN

## (undated) DEVICE — BIPOLAR FORCEP STRYKER STANDARD 8" X 1MM (YELLOW)

## (undated) DEVICE — MEDTRONIC AXIEM TRACER POINTER

## (undated) DEVICE — SUT VICRYL PLUS 4-0 18" RB-1 UNDYED (POP-OFF)

## (undated) DEVICE — VENODYNE/SCD SLEEVE CALF MEDIUM

## (undated) DEVICE — DRAPE TOWEL BLUE 17" X 24"

## (undated) DEVICE — DRAPE TOWEL BLUE STICKY

## (undated) DEVICE — LIJ-STORZ MITAKA ARM CLAMPS: Type: DURABLE MEDICAL EQUIPMENT

## (undated) DEVICE — AESCULAP INTRODUCER 19FR FOR MINOP TROCAR

## (undated) DEVICE — DRSG TAPE HYPAFIX 4"

## (undated) DEVICE — TUBING IV EXTENSION LO PRES 60"

## (undated) DEVICE — LABELS BLANK W PEN

## (undated) DEVICE — DRSG CURITY GAUZE SPONGE 4 X 4" 12-PLY

## (undated) DEVICE — DRSG XEROFORM 1 X 8"

## (undated) DEVICE — WARMING BLANKET FULL ADULT

## (undated) DEVICE — DRAPE C ARM BAND BAG

## (undated) DEVICE — NDL HYPO REGULAR BEVEL 25G X 1.5" (BLUE)

## (undated) DEVICE — STAPLER SKIN MULTI DIRECTION W35

## (undated) DEVICE — SUT VICRYL 3-0 18" SH UNDYED (POP-OFF)

## (undated) DEVICE — SOL IRR POUR H2O 500ML

## (undated) DEVICE — MEDTRONIC AXIEM STYLET 23CM

## (undated) DEVICE — STAPLER SKIN PROXIMATE

## (undated) DEVICE — DRAPE 3/4 SHEET 52X76"

## (undated) DEVICE — PREP DURAPREP 26CC

## (undated) DEVICE — MIDAS REX LEGEND LUBRICANT DIFFUSER CARTRIDGE

## (undated) DEVICE — SUT MONOCRYL 4-0 27" PS-2 UNDYED

## (undated) DEVICE — DRSG BENZOIN 0.6CC

## (undated) DEVICE — POSITIONER STRAP ARMBOARD VELCRO TS-30

## (undated) DEVICE — ELCTR GROUNDING PAD INFANT COVIDIEN

## (undated) DEVICE — MARKING PEN W RULER

## (undated) DEVICE — DRAPE SPLIT SHEET 77" X 120"

## (undated) DEVICE — ACRA-CUT CRANIAL PERFORATOR ADULT 14MM X 11MM (WHITE)

## (undated) DEVICE — PACK NEURO

## (undated) DEVICE — POSITIONER FOAM EGG CRATE ULNAR 2PCS (PINK)

## (undated) DEVICE — DRAPE CRANI INCISION 70X110"

## (undated) DEVICE — ELCTR BOVIE PENCIL BLADE 10FT

## (undated) DEVICE — DRSG TELFA 3 X 8

## (undated) DEVICE — DRAPE MITAKA POINT SETTER UNIARM & CHAIR

## (undated) DEVICE — MEDTRONIC TRACKER BUNDLE NI

## (undated) DEVICE — SUT MONOCRYL 5-0 18" P-1 UNDYED